# Patient Record
Sex: FEMALE | Race: WHITE | NOT HISPANIC OR LATINO
[De-identification: names, ages, dates, MRNs, and addresses within clinical notes are randomized per-mention and may not be internally consistent; named-entity substitution may affect disease eponyms.]

---

## 2017-01-17 ENCOUNTER — APPOINTMENT (OUTPATIENT)
Dept: OTOLARYNGOLOGY | Facility: CLINIC | Age: 67
End: 2017-01-17

## 2017-01-17 VITALS
HEIGHT: 64 IN | WEIGHT: 180 LBS | BODY MASS INDEX: 30.73 KG/M2 | DIASTOLIC BLOOD PRESSURE: 86 MMHG | HEART RATE: 62 BPM | SYSTOLIC BLOOD PRESSURE: 128 MMHG

## 2017-01-17 DIAGNOSIS — R68.89 OTHER GENERAL SYMPTOMS AND SIGNS: ICD-10-CM

## 2017-01-17 DIAGNOSIS — F45.8 OTHER SOMATOFORM DISORDERS: ICD-10-CM

## 2017-01-17 DIAGNOSIS — I10 ESSENTIAL (PRIMARY) HYPERTENSION: ICD-10-CM

## 2017-01-17 DIAGNOSIS — K21.9 GASTRO-ESOPHAGEAL REFLUX DISEASE W/OUT ESOPHAGITIS: ICD-10-CM

## 2017-01-17 DIAGNOSIS — E78.00 PURE HYPERCHOLESTEROLEMIA, UNSPECIFIED: ICD-10-CM

## 2017-01-17 DIAGNOSIS — Z87.891 PERSONAL HISTORY OF NICOTINE DEPENDENCE: ICD-10-CM

## 2017-01-20 RX ORDER — ATENOLOL AND CHLORTHALIDONE 50; 25 MG/1; MG/1
50-25 TABLET ORAL
Refills: 0 | Status: ACTIVE | COMMUNITY

## 2017-01-20 RX ORDER — EZETIMIBE 10 MG/1
10 TABLET ORAL
Refills: 0 | Status: ACTIVE | COMMUNITY

## 2017-01-20 RX ORDER — ROSUVASTATIN CALCIUM 40 MG/1
40 TABLET, FILM COATED ORAL
Refills: 0 | Status: ACTIVE | COMMUNITY

## 2017-01-21 PROBLEM — I10 HIGH BLOOD PRESSURE: Status: ACTIVE | Noted: 2017-01-17

## 2017-01-21 PROBLEM — Z87.891 FORMER SMOKER: Status: ACTIVE | Noted: 2017-01-17

## 2017-01-21 PROBLEM — E78.00 HIGH CHOLESTEROL: Status: ACTIVE | Noted: 2017-01-17

## 2017-04-18 ENCOUNTER — APPOINTMENT (OUTPATIENT)
Dept: OTOLARYNGOLOGY | Facility: CLINIC | Age: 67
End: 2017-04-18

## 2018-10-25 ENCOUNTER — APPOINTMENT (OUTPATIENT)
Dept: ORTHOPEDIC SURGERY | Facility: CLINIC | Age: 68
End: 2018-10-25
Payer: COMMERCIAL

## 2018-10-25 VITALS
OXYGEN SATURATION: 97 % | HEIGHT: 64 IN | HEART RATE: 73 BPM | WEIGHT: 180 LBS | DIASTOLIC BLOOD PRESSURE: 80 MMHG | BODY MASS INDEX: 30.73 KG/M2 | SYSTOLIC BLOOD PRESSURE: 126 MMHG

## 2018-10-25 PROCEDURE — 20610 DRAIN/INJ JOINT/BURSA W/O US: CPT | Mod: RT

## 2018-10-25 PROCEDURE — 99204 OFFICE O/P NEW MOD 45 MIN: CPT | Mod: 25

## 2018-10-25 PROCEDURE — 73564 X-RAY EXAM KNEE 4 OR MORE: CPT | Mod: RT

## 2018-10-25 RX ORDER — METHYLPRED ACET/NACL,ISO-OS/PF 40 MG/ML
40 VIAL (ML) INJECTION
Qty: 1 | Refills: 0 | Status: ACTIVE | COMMUNITY
Start: 2018-10-25

## 2019-03-31 ENCOUNTER — FORM ENCOUNTER (OUTPATIENT)
Age: 69
End: 2019-03-31

## 2019-04-01 ENCOUNTER — APPOINTMENT (OUTPATIENT)
Dept: ORTHOPEDIC SURGERY | Facility: CLINIC | Age: 69
End: 2019-04-01
Payer: COMMERCIAL

## 2019-04-01 ENCOUNTER — OUTPATIENT (OUTPATIENT)
Dept: OUTPATIENT SERVICES | Facility: HOSPITAL | Age: 69
LOS: 1 days | End: 2019-04-01
Payer: COMMERCIAL

## 2019-04-01 DIAGNOSIS — Z78.9 OTHER SPECIFIED HEALTH STATUS: ICD-10-CM

## 2019-04-01 DIAGNOSIS — Z86.39 PERSONAL HISTORY OF OTHER ENDOCRINE, NUTRITIONAL AND METABOLIC DISEASE: ICD-10-CM

## 2019-04-01 PROCEDURE — 73562 X-RAY EXAM OF KNEE 3: CPT

## 2019-04-01 PROCEDURE — 20610 DRAIN/INJ JOINT/BURSA W/O US: CPT | Mod: RT

## 2019-04-01 PROCEDURE — 73562 X-RAY EXAM OF KNEE 3: CPT | Mod: 26,RT

## 2019-04-01 PROCEDURE — 99213 OFFICE O/P EST LOW 20 MIN: CPT | Mod: 25

## 2019-04-01 NOTE — REVIEW OF SYSTEMS
[Joint Pain] : joint pain [Negative] : Heme/Lymph Consent (Spinal Accessory)/Introductory Paragraph: The rationale for Mohs was explained to the patient and consent was obtained. The risks, benefits and alternatives to therapy were discussed in detail. Specifically, the risks of damage to the spinal accessory nerve, infection, scarring, bleeding, prolonged wound healing, incomplete removal, allergy to anesthesia, and recurrence were addressed. Prior to the procedure, the treatment site was clearly identified and confirmed by the patient. All components of Universal Protocol/PAUSE Rule completed.

## 2019-04-02 VITALS
BODY MASS INDEX: 30.73 KG/M2 | SYSTOLIC BLOOD PRESSURE: 120 MMHG | HEART RATE: 75 BPM | OXYGEN SATURATION: 99 % | HEIGHT: 64 IN | DIASTOLIC BLOOD PRESSURE: 76 MMHG | WEIGHT: 180 LBS

## 2019-04-02 PROBLEM — Z86.39 HISTORY OF HIGH CHOLESTEROL: Status: RESOLVED | Noted: 2019-04-02 | Resolved: 2019-04-02

## 2019-04-02 PROBLEM — Z78.9 EXERCISES OCCASIONALLY: Status: ACTIVE | Noted: 2019-04-02

## 2019-04-02 PROBLEM — Z78.9 DOES NOT USE ILLICIT DRUGS: Status: ACTIVE | Noted: 2019-04-02

## 2019-04-08 NOTE — PHYSICAL EXAM
[de-identified] : Focused exam of the right knee: No scars or previous incisions, no swelling edema erythema redness or drainage, tender medially, range of motion 0-120, stable, distally neurovascularly intact, 5/5 strength, no assistive devices. [de-identified] : X-rays of the left knee show bone-on-bone contact in medial compartment and moderately decreased joint space in the patellofemoral compartment.

## 2019-04-08 NOTE — PROCEDURE
[de-identified] : After obtaining verbal consent and under the normal sterile conditions the right knee joint was injected today with a solution of 4 cc of 1% lidocaine and 1 cc of 40 mg per mL of triamcinolone. The patient tolerated the procedure well.

## 2019-04-08 NOTE — ASSESSMENT
[FreeTextEntry1] : Assessment\par #1 left knee arthritis\par \par Plan\par #1 the left knee was injected as described above\par #2 followup in 2 weeks may consider shoulder injections at that time...

## 2019-05-06 ENCOUNTER — APPOINTMENT (OUTPATIENT)
Dept: ORTHOPEDIC SURGERY | Facility: CLINIC | Age: 69
End: 2019-05-06
Payer: COMMERCIAL

## 2019-05-06 PROCEDURE — 20610 DRAIN/INJ JOINT/BURSA W/O US: CPT | Mod: RT

## 2019-05-06 PROCEDURE — 99212 OFFICE O/P EST SF 10 MIN: CPT | Mod: 25

## 2019-05-08 NOTE — PROCEDURE
[de-identified] : After obtaining verbal consent and under the normal sterile conditions each shoulder was injected with a solution of 4 cc of 1% lidocaine and 1 cc of 40 mg per mL of triamcinolone.

## 2019-05-08 NOTE — ASSESSMENT
[FreeTextEntry1] : Assessment\par #1 internal derangement bilateral shoulders\par \par Plan\par #1 bilateral shoulders were injected as described above\par #2 followup in 2 months for repeat injections of bilateral knees

## 2019-05-08 NOTE — PHYSICAL EXAM
[de-identified] : Focused exam bilateral shoulders: No swelling edema erythema redness or drainage. Nontender. Range of motion: Forward flexion 150, abduction 150, internal/external rotation 60, 5/5 strength.

## 2019-05-08 NOTE — HISTORY OF PRESENT ILLNESS
[de-identified] : A 69-year-old lady with known history of bilateral knee and shoulder arthritis her today for her shoulders. She has pain with overhead reaching, dressing, and lifting greater than 10 pounds. She has intermittent mechanical symptoms without subluxation dislocation.

## 2019-06-12 ENCOUNTER — APPOINTMENT (OUTPATIENT)
Dept: ORTHOPEDIC SURGERY | Facility: CLINIC | Age: 69
End: 2019-06-12

## 2019-09-09 ENCOUNTER — APPOINTMENT (OUTPATIENT)
Dept: ORTHOPEDIC SURGERY | Facility: CLINIC | Age: 69
End: 2019-09-09

## 2019-09-18 ENCOUNTER — APPOINTMENT (OUTPATIENT)
Dept: ORTHOPEDIC SURGERY | Facility: CLINIC | Age: 69
End: 2019-09-18
Payer: COMMERCIAL

## 2019-09-18 PROCEDURE — 20610 DRAIN/INJ JOINT/BURSA W/O US: CPT | Mod: RT

## 2019-09-18 PROCEDURE — 99213 OFFICE O/P EST LOW 20 MIN: CPT | Mod: 25

## 2019-09-25 ENCOUNTER — APPOINTMENT (OUTPATIENT)
Dept: PULMONOLOGY | Facility: CLINIC | Age: 69
End: 2019-09-25
Payer: COMMERCIAL

## 2019-09-25 VITALS
SYSTOLIC BLOOD PRESSURE: 150 MMHG | OXYGEN SATURATION: 97 % | HEIGHT: 64 IN | BODY MASS INDEX: 30.73 KG/M2 | HEART RATE: 83 BPM | DIASTOLIC BLOOD PRESSURE: 96 MMHG | TEMPERATURE: 97.7 F | WEIGHT: 180 LBS

## 2019-09-25 DIAGNOSIS — R09.82 POSTNASAL DRIP: ICD-10-CM

## 2019-09-25 DIAGNOSIS — R05 COUGH: ICD-10-CM

## 2019-09-25 PROCEDURE — 99204 OFFICE O/P NEW MOD 45 MIN: CPT

## 2019-09-25 RX ORDER — HYDROCODONE POLISTIREX AND CHLORPHENIRAMINE POLISTIREX 10; 8 MG/5ML; MG/5ML
10-8 SUSPENSION, EXTENDED RELEASE ORAL
Qty: 1 | Refills: 0 | Status: DISCONTINUED | COMMUNITY
Start: 2019-09-25 | End: 2019-09-25

## 2019-09-25 NOTE — REVIEW OF SYSTEMS
[Nasal Congestion] : nasal congestion [Postnasal Drip] : postnasal drip [Cough] : cough [Sputum] : not coughing up ~M sputum [Dyspnea] : no dyspnea [Chest Tightness] : no chest tightness [Negative] : Hematologic

## 2019-09-25 NOTE — PHYSICAL EXAM
[General Appearance - Well Developed] : well developed [General Appearance - Well Nourished] : well nourished [Normal Conjunctiva] : the conjunctiva exhibited no abnormalities [Normal Oropharynx] : normal oropharynx [Neck Appearance] : the appearance of the neck was normal [Heart Rate And Rhythm] : heart rate and rhythm were normal [Heart Sounds] : normal S1 and S2 [Murmurs] : no murmurs present [Arterial Pulses Normal] : the arterial pulses were normal [Respiration, Rhythm And Depth] : normal respiratory rhythm and effort [Auscultation Breath Sounds / Voice Sounds] : lungs were clear to auscultation bilaterally [Bowel Sounds] : normal bowel sounds [Abdomen Soft] : soft [Abdomen Tenderness] : non-tender [] : no hepato-splenomegaly [Nail Clubbing] : no clubbing of the fingernails [Abnormal Walk] : normal gait [Cyanosis, Localized] : no localized cyanosis [Skin Color & Pigmentation] : normal skin color and pigmentation [Skin Turgor] : normal skin turgor

## 2019-09-25 NOTE — HISTORY OF PRESENT ILLNESS
[FreeTextEntry1] : 69F with 3 weeks of dry persistent cough which started as a cold. Mostly dry with significant postnasal drip. No sob or mills, symptoms prominent at night. Inhaler albuterol did not help. She reports always coughing for long time after a cold. Denies seasonal allergies, no fever or chills, no GERD symptoms.

## 2019-09-25 NOTE — ASSESSMENT
[FreeTextEntry1] : subacute dry cough, most likely postviral syndrome and postnasal drip. Recommend flonase BID, claritin and asmanex 2 puffs BID. Hydrocodone at night. RTC in 4 weeks for f/u. CXR was clear.

## 2019-09-26 ENCOUNTER — OTHER (OUTPATIENT)
Age: 69
End: 2019-09-26

## 2019-09-26 RX ORDER — HYDROCODONE POLISTIREX AND CHLORPHENIRAMINE POLISTIREX 10; 8 MG/5ML; MG/5ML
10-8 SUSPENSION, EXTENDED RELEASE ORAL
Qty: 1 | Refills: 0 | Status: ACTIVE | COMMUNITY
Start: 2019-09-25 | End: 1900-01-01

## 2019-09-26 NOTE — PHYSICAL EXAM
[de-identified] : General: Not in acute distress, dressed appropriately, sitting on examination table\par Skin: Warm and dry, normal turgor, no rashes\par Neurological: AOx3, Cranial nerves grossly in tact\par Psych: Mood and affect appropriate\par \par Right Knee: No swelling edema erythema redness or drainage. Alignment: Neutral. Tender: Medially. ROM: 0-120. Stable. . 5/5 Strength. DNVI. Ambulates with no devices.\par \par

## 2019-09-26 NOTE — PROCEDURE
[de-identified] : After obtaining verbal consent and under normal sterile conditions the right knee joint was injected with 4 cc of lidocaine and 1 cc of 40 mg per mL of Kenalog.

## 2019-09-26 NOTE — ASSESSMENT
[FreeTextEntry1] : Assessment/plan\par Right knee arthritis\par \par The right knee was injected as described above, today she'll rest ice and use Tylenol as needed for pain, she'll follow up in 3 months or sooner as needed if she wants another injection.\par \par All medical record entries made by the PA/Susie/Fellow are at my, Dr. Toñito Raygoza's direction and personally dictated by me on 09/18/2019]. I have reviewed the chart and agree that the record accurately reflects my personal performance of the history, physical exam, assessment, and plan. I have also personally directed reviewed, and agreed with the chart.\par

## 2019-09-26 NOTE — HISTORY OF PRESENT ILLNESS
[de-identified] : Here today for her right knee injection, her last one was back in May is worn off recently she is requesting repeat injection now. No other complaints at this time.

## 2019-11-27 ENCOUNTER — APPOINTMENT (OUTPATIENT)
Dept: ORTHOPEDIC SURGERY | Facility: CLINIC | Age: 69
End: 2019-11-27
Payer: COMMERCIAL

## 2019-11-27 PROCEDURE — 99213 OFFICE O/P EST LOW 20 MIN: CPT

## 2019-11-27 RX ORDER — MELOXICAM 7.5 MG/1
7.5 TABLET ORAL DAILY
Qty: 60 | Refills: 0 | Status: ACTIVE | COMMUNITY
Start: 2019-11-27 | End: 1900-01-01

## 2019-12-05 NOTE — HISTORY OF PRESENT ILLNESS
[de-identified] : 70 yo female here c/o intermittent, dull/achy, 7/10, right knee pain since a fall and twisting injury of the right knee.\par \par No current medications; cortico injection on 09/18/2020 of right knee; Never had PT

## 2019-12-05 NOTE — ASSESSMENT
[FreeTextEntry1] : Assessment/Plan:\par \par Pt. right knee pain has returned and would benefit right knee cortico steroid injection today. \par \par Educated Pt. on at home joint strengthening exercises and weight loss; Prescribed Mobiq 7.5 mg 1-2x/day; cortico injection of the right knee was performed today

## 2019-12-05 NOTE — REASON FOR VISIT
[Follow-Up Visit] : a follow-up visit for [Initial Visit] : an initial visit for [Knee Pain] : knee pain [Other: ____] : [unfilled] [Hip Pain] : hip pain [FreeTextEntry2] : b

## 2019-12-05 NOTE — REASON FOR VISIT
[Follow-Up Visit] : a follow-up visit for [Initial Visit] : an initial visit for [Other: ____] : [unfilled] [Knee Pain] : knee pain [Hip Pain] : hip pain [FreeTextEntry2] : b

## 2019-12-05 NOTE — PROCEDURE
[de-identified] : After obtaining verbal consent and after clearly explaining the risks, benefits, complications, complications, and alternatives. Under the normal sterile conditions the area was sterile prepped and the right knee was injected with a syringe that contains a solution of 1cc of triamcinolone (40 mg/mL) and 4 cc of 1% lidocaine (10 mg/mL)

## 2019-12-05 NOTE — PROCEDURE
[de-identified] : After obtaining verbal consent and after clearly explaining the risks, benefits, complications, complications, and alternatives. Under the normal sterile conditions the area was sterile prepped and the right knee was injected with a syringe that contains a solution of 1cc of triamcinolone (40 mg/mL) and 4 cc of 1% lidocaine (10 mg/mL)

## 2019-12-05 NOTE — HISTORY OF PRESENT ILLNESS
[de-identified] : 70 yo female here c/o intermittent, dull/achy, 7/10, right knee pain since a fall and twisting injury of the right knee.\par \par No current medications; cortico injection on 09/18/2020 of right knee; Never had PT

## 2019-12-05 NOTE — PHYSICAL EXAM
[de-identified] : Not in acute distress, dressed appropriately, sitting on examination table \par Skin: Warm and dry, normal turgor, no rashes \par Neurological: AOx3, Cranial nerves grossly in tact \par Psych: Mood and affect appropriate \par Focused exam of the Right Knee: No swelling edema erythema redness or drainage. Non-tender to palpation. Non-tender w/ MCL & LCL examination. Alignment: Neutral. ROM: 0-120. Stable. 5/5 Strength. DNVI. Ambulates without devices.\par \par Focused exam of the Left Knee: No swelling edema erythema redness or drainage. Non-tender to palpation. Non-tender w/ MCL & LCL examination. Alignment: Neutral. ROM: 0-120. Stable. 5/5 Strength. DNVI. Ambulates without devices.\par  [de-identified] : no images today

## 2019-12-05 NOTE — PHYSICAL EXAM
[de-identified] : Not in acute distress, dressed appropriately, sitting on examination table \par Skin: Warm and dry, normal turgor, no rashes \par Neurological: AOx3, Cranial nerves grossly in tact \par Psych: Mood and affect appropriate \par Focused exam of the Right Knee: No swelling edema erythema redness or drainage. Non-tender to palpation. Non-tender w/ MCL & LCL examination. Alignment: Neutral. ROM: 0-120. Stable. 5/5 Strength. DNVI. Ambulates without devices.\par \par Focused exam of the Left Knee: No swelling edema erythema redness or drainage. Non-tender to palpation. Non-tender w/ MCL & LCL examination. Alignment: Neutral. ROM: 0-120. Stable. 5/5 Strength. DNVI. Ambulates without devices.\par  [de-identified] : no images today

## 2020-02-04 NOTE — HISTORY OF PRESENT ILLNESS
[de-identified] : 69-year-old lady with a known history of right knee arthritis here today for a cortisone injection. She has done very well with these injections periodically every 8-12 months for the last several years. Her knee pain is medial and anterior worse with weightbearing ambulation and stairs, better with rest and Tylenol p.r.n. sudden onset

## 2020-03-10 ENCOUNTER — APPOINTMENT (OUTPATIENT)
Dept: ORTHOPEDIC SURGERY | Facility: CLINIC | Age: 70
End: 2020-03-10
Payer: COMMERCIAL

## 2020-03-10 PROCEDURE — 20610 DRAIN/INJ JOINT/BURSA W/O US: CPT | Mod: RT,59

## 2020-03-10 PROCEDURE — 99213 OFFICE O/P EST LOW 20 MIN: CPT | Mod: 25

## 2020-03-11 NOTE — PROCEDURE
[de-identified] : After obtaining verbal consent and under normal sterile conditions the right knee and bilateral shoulders joints was injected with 4 cc of lidocaine and 1 cc of 40 mg per mL of Kenalog.\par \par

## 2020-03-11 NOTE — END OF VISIT
[FreeTextEntry3] : By signing my name below, I, Baylee Cohen, attest that this documentation has been prepared under the direction and in the presence of Raymond Giron PA-C.

## 2020-03-11 NOTE — ASSESSMENT
[FreeTextEntry1] : Assessment/Plan\par \par Bilateral shoulder OA\par Right knee OA\par \par The right knee and bilateral shoulders was injected as described above, today they will rest ice and use Tylenol as needed for pain. \par \par F/U in 3 months or as needed\par \par \par All medical record entries made by the PA/Susie/Fellow are at my, Dr. Toñito Raygoza's direction and personally dictated by me on 03/10/2020]. I have reviewed the chart and agree that the record accurately reflects my personal performance of the history, physical exam, assessment, and plan. I have also personally directed reviewed, and agreed with the chart.\par \par \par

## 2020-03-11 NOTE — HISTORY OF PRESENT ILLNESS
[de-identified] : Taylor is a 70 year old female here for follow up of right knee and bilateral shoulder pain. Patient is requesting to have a bilateral shoulder and right knee injection today. No changes since last visit.

## 2020-03-11 NOTE — PHYSICAL EXAM
[de-identified] : Not in acute distress, dressed appropriately, sitting on examination table \par Skin: Warm and dry, normal turgor, no rashes \par Neurological: AOx3, Cranial nerves grossly in tact \par Psych: Mood and affect appropriate \par \par Focused exam of the Right Knee: No swelling edema erythema redness or drainage. Non-tender to palpation. Non-tender w/ MCL & LCL examination. Alignment: Neutral. ROM: 0-120. Stable. 5/5 Strength. DNVI. Ambulates without devices. [de-identified] : No imaging today.\par

## 2020-03-20 ENCOUNTER — APPOINTMENT (OUTPATIENT)
Dept: PULMONOLOGY | Facility: CLINIC | Age: 70
End: 2020-03-20

## 2020-03-24 RX ORDER — MOMETASONE FUROATE 200 UG/1
200 AEROSOL RESPIRATORY (INHALATION) TWICE DAILY
Qty: 3 | Refills: 0 | Status: DISCONTINUED | COMMUNITY
Start: 2020-03-19 | End: 2020-03-24

## 2020-03-24 RX ORDER — BECLOMETHASONE DIPROPIONATE HFA 80 UG/1
80 AEROSOL, METERED RESPIRATORY (INHALATION)
Qty: 3 | Refills: 0 | Status: ACTIVE | COMMUNITY
Start: 2020-03-24 | End: 1900-01-01

## 2020-06-17 ENCOUNTER — APPOINTMENT (OUTPATIENT)
Dept: ORTHOPEDIC SURGERY | Facility: CLINIC | Age: 70
End: 2020-06-17
Payer: COMMERCIAL

## 2020-06-17 PROCEDURE — 99213 OFFICE O/P EST LOW 20 MIN: CPT | Mod: 25

## 2020-06-17 PROCEDURE — 20610 DRAIN/INJ JOINT/BURSA W/O US: CPT | Mod: LT

## 2020-06-26 NOTE — PHYSICAL EXAM
[de-identified] : Not in acute distress, dressed appropriately, sitting on examination table \par Skin: Warm and dry, normal turgor, no rashes \par Neurological: AOx3, Cranial nerves grossly in tact \par Psych: Mood and affect appropriate \par Focused exam of the Right Knee: No swelling edema erythema redness or drainage. medially tender to palpation. Non-tender w/ MCL & LCL examination. Alignment: Neutral. ROM: 0-110. Stable. 5/5 Strength. DNVI. Ambulates without devices.\par \par Focused exam of the Left Knee: No swelling edema erythema redness or drainage. medially tender to palpation. Non-tender w/ MCL & LCL examination. Alignment: Neutral. ROM: 0-110. Stable. 5/5 Strength. DNVI. Ambulates without devices.\par \par \par \par  [de-identified] : Xrays shows Right knee severe bone on bone OA

## 2020-06-26 NOTE — HISTORY OF PRESENT ILLNESS
[de-identified] : 69 yo female here c/o intermittent, 8/10, dull/achy, bilateral shoulder pain and Right knee pain since 3 months ago.\par \par Last b/l shoulder and Right knee corticosteroid injection performed 03/10/20 Bladder non-tender and non-distended. Urine clear yellow.

## 2020-06-26 NOTE — ASSESSMENT
[FreeTextEntry1] : Assessment/Plan:\par \par    71 yo female here with severe Right knee osteoarthritis here requesting bilateral knee corticosteroid injections today \par \par   Plan:  \par 1) left knees were injected with corticosteroid injection as described above, today they will rest ice and use Tylenol as needed for pain.\par   2) F/U in 3 months.  \par \par All medical record entries made by the PA/Scribe/Fellow are at my, Dr. Toñito Raygoza's direction and personally dictated by me on 06/17/2020]. I have reviewed the chart and agree that the record accurately reflects my personal performance of the history, physical exam, assessment, and plan. I have also personally directed reviewed, and agreed with the chart.\par

## 2020-06-26 NOTE — PROCEDURE
[de-identified] : \par After obtaining verbal consent and after clearly explaining the risks, benefits, complications, complications, and alternatives. Under the normal sterile conditions the area was sterile prepped and left knee was injected with a syringe x1 that contains a solution of 1cc of triamcinolone (40 mg/mL) and 4 cc of 1% lidocaine (10 mg/mL)\par

## 2020-07-06 ENCOUNTER — APPOINTMENT (OUTPATIENT)
Dept: ORTHOPEDIC SURGERY | Facility: CLINIC | Age: 70
End: 2020-07-06
Payer: COMMERCIAL

## 2020-07-06 PROCEDURE — 20610 DRAIN/INJ JOINT/BURSA W/O US: CPT | Mod: 59,RT

## 2020-07-06 PROCEDURE — 99213 OFFICE O/P EST LOW 20 MIN: CPT | Mod: 25

## 2020-07-10 NOTE — DISCUSSION/SUMMARY
[de-identified] : A\par R knee OA\par R shoulder OA\par \par P\par R knee and shoulder CSI\par Home exercises\par OTC analgesia\par New bilateral knee and shoulder xrays at next visit\par RTO 3 months prn\par \par \par All medical record entries made by the PA/Susie/Fellow are at my, Dr. Toñito Raygoza's direction and personally dictated by me on 07/06/2020]. I have reviewed the chart and agree that the record accurately reflects my personal performance of the history, physical exam, assessment, and plan. I have also personally directed reviewed, and agreed with the chart.\par

## 2020-07-10 NOTE — PROCEDURE
[de-identified] : Under sterile conditions afer verbal consent the right knee and shoulder was injected with 40mg Kenalog and 4cc of lidocaine plain. Patient tolerated well with interval improvement in symptoms.

## 2020-07-10 NOTE — HISTORY OF PRESENT ILLNESS
[de-identified] : F/u for right knee and shoulder injection for OA. responded well after her CSIs in March. Continuing to take OTC analgesia.

## 2020-11-18 ENCOUNTER — APPOINTMENT (OUTPATIENT)
Dept: ORTHOPEDIC SURGERY | Facility: CLINIC | Age: 70
End: 2020-11-18
Payer: COMMERCIAL

## 2020-11-18 PROCEDURE — 20610 DRAIN/INJ JOINT/BURSA W/O US: CPT | Mod: 50

## 2020-11-18 PROCEDURE — 99214 OFFICE O/P EST MOD 30 MIN: CPT | Mod: 25

## 2020-11-18 RX ORDER — MELOXICAM 15 MG/1
15 TABLET ORAL
Qty: 60 | Refills: 0 | Status: ACTIVE | COMMUNITY
Start: 2020-11-18 | End: 1900-01-01

## 2020-11-21 NOTE — PHYSICAL EXAM
[de-identified] : General: Not in acute distress, dressed appropriately, sitting on examination table\par Skin: Warm and dry, normal turgor, no rashes\par Neurological: AOx3, cranial nerves grossly intact\par Psych: Mood and affect appropriate \par \par Right Knee: No swelling, edema, erythema, redness, or drainage. Tender medially. Alignment: Neutral ROM: 0-110 with pain. + crepitus. Stable. 5/5 strength. DNVI. Ambulates without assistance. \par \par Left Knee: No swelling, edema, erythema, redness, or drainage. Tender medially. Alignment: Neutral ROM: 0-110 with pain. + crepitus Stable. 5/5 strength. DNVI. Ambulates without assistance. \par \par Right Shoulder: Pain and crepitus with ROM. DNVI.\par \par Left Shoulder: Pain and crepitus with ROM. DNVI.\par \par  [de-identified] : No imaging today.

## 2020-11-21 NOTE — PROCEDURE
[de-identified] : After obtaining verbal consent and under normal sterile conditions the bilateral knees joints were injected with 4ccs of lidocaine and 1cc of 40mg/mL Kenalog.\par \par After obtaining verbal consent and under normal sterile conditions bilateral shoulder joints were injected with 4ccs of lidocaine and 1cc of 40mg/mL Kenalog.

## 2020-11-21 NOTE — ASSESSMENT
[FreeTextEntry1] : Assessment: \par Bilateral Knee OA; Bilateral Shoulder OA\par \par Plan:\par The bilateral knees and shoulders were injected as described above. Today they will rest, ice and use tylenol as needed for pain. Will also give prescription for Mobic. \par \par F/U in 3 months.\par \par All medical record entries made by the PA/Scribe/Fellow are at my, Dr. Toñito Raygoza's direction and personally dictated by me on 11/18/2020]. I have reviewed the chart and agree that the record accurately reflects my personal performance of the history, physical exam, assessment, and plan. I have also personally directed reviewed, and agreed with the chart.\par

## 2020-11-21 NOTE — END OF VISIT
[FreeTextEntry3] : By signing my name below, I, Roslyn Villa, attest that this documentation has been prepared under the direction and in the presence of Jarett Conklin MD.

## 2020-11-21 NOTE — HISTORY OF PRESENT ILLNESS
[de-identified] : 69 yo F here today for bilateral knee and shoulder steroid injections. Long history of OA in bilateral shoulders and knees. Reports good relief from previous CSI to the right knee and shoulder in July 2020. Pain has returned and moved bilaterally in last few weeks. Shoulder pain is rated 6/10, worse with overhead movements. Knee pain is rated 8/10, worse with prolonged ambulation and stairs, alleviated with rest.

## 2020-12-02 ENCOUNTER — APPOINTMENT (OUTPATIENT)
Dept: ORTHOPEDIC SURGERY | Facility: CLINIC | Age: 70
End: 2020-12-02

## 2021-05-18 ENCOUNTER — APPOINTMENT (OUTPATIENT)
Dept: ORTHOPEDIC SURGERY | Facility: CLINIC | Age: 71
End: 2021-05-18
Payer: MEDICAID

## 2021-05-18 DIAGNOSIS — M24.811 OTHER SPECIFIC JOINT DERANGEMENTS OF RIGHT SHOULDER, NOT ELSEWHERE CLASSIFIED: ICD-10-CM

## 2021-05-18 DIAGNOSIS — M17.11 UNILATERAL PRIMARY OSTEOARTHRITIS, RIGHT KNEE: ICD-10-CM

## 2021-05-18 DIAGNOSIS — M25.561 PAIN IN RIGHT KNEE: ICD-10-CM

## 2021-05-18 DIAGNOSIS — M23.92 UNSPECIFIED INTERNAL DERANGEMENT OF LEFT KNEE: ICD-10-CM

## 2021-05-18 PROCEDURE — 99212 OFFICE O/P EST SF 10 MIN: CPT | Mod: 25

## 2021-05-18 PROCEDURE — 20610 DRAIN/INJ JOINT/BURSA W/O US: CPT | Mod: 50

## 2021-05-28 NOTE — PROCEDURE
[de-identified] : After obtaining verbal consent and under normal sterile conditions the bilateral knees joints were injected with 4ccs of lidocaine and 1cc of 40mg/mL Kenalog.\par \par After obtaining verbal consent and under normal sterile conditions bilateral shoulder joints were injected with 4ccs of lidocaine and 1cc of 40mg/mL Kenalog. \par

## 2021-05-28 NOTE — END OF VISIT
[FreeTextEntry3] : By signing my name below, I, Angy Guevara, attest that this documentation has been prepared under the direction and in the presence of Raymond Giron PA-C.\par

## 2021-05-28 NOTE — HISTORY OF PRESENT ILLNESS
[de-identified] : 72 y/o F here today to follow up for bilateral knee and shoulder pain. She has had csi done on last visit 11/18/20 and reports that it provided her with great relief. However the pain has been worsening in the past few weeks and she would like repeat injections today. \par \par \par

## 2021-05-28 NOTE — PHYSICAL EXAM
[de-identified] : General: Not in acute distress, dressed appropriately, sitting on examination table\par Skin: Warm and dry, normal turgor, no rashes\par Neurological: AOx3, cranial nerves grossly intact\par Psych: Mood and affect appropriate \par \par Right Knee: No swelling, edema, erythema, redness, or drainage. Tender medially. Alignment: Neutral ROM: 0-110 with pain. + crepitus. Stable. 5/5 strength. DNVI. Ambulates without assistance. \par \par Left Knee: No swelling, edema, erythema, redness, or drainage. Tender medially. Alignment: Neutral ROM: 0-110 with pain. + crepitus Stable. 5/5 strength. DNVI. Ambulates without assistance. \par \par Right Shoulder: Pain and crepitus with ROM. DNVI.\par \par Left Shoulder: Pain and crepitus with ROM. DNVI. [de-identified] : No imaging today.\par

## 2021-06-09 ENCOUNTER — APPOINTMENT (OUTPATIENT)
Dept: ORTHOPEDIC SURGERY | Facility: CLINIC | Age: 71
End: 2021-06-09
Payer: COMMERCIAL

## 2021-06-09 PROCEDURE — ZZZZZ: CPT

## 2021-12-17 NOTE — PHYSICAL EXAM
----- Message from Jeana Lindsey MD sent at 12/17/2021  1:49 PM CST -----  Thyroid ok, t3 and t4 in normal range, continue levothyroxine 100 mcg daily   [de-identified] : RUE: Pain an crepitus with ROM shoulder. - spurlings. limited abduction. DNVI\par RLE: Pain and creiputs with ROM with trace effusion and no erythema. Lig stable to knee. DNVI

## 2024-08-01 ENCOUNTER — TRANSCRIPTION ENCOUNTER (OUTPATIENT)
Age: 74
End: 2024-08-01

## 2024-08-01 VITALS
TEMPERATURE: 98 F | OXYGEN SATURATION: 95 % | SYSTOLIC BLOOD PRESSURE: 138 MMHG | HEART RATE: 70 BPM | RESPIRATION RATE: 16 BRPM | WEIGHT: 174.17 LBS | HEIGHT: 61 IN | DIASTOLIC BLOOD PRESSURE: 83 MMHG

## 2024-08-01 RX ORDER — POVIDONE-IODINE 0.1 G/ML
1 SOLUTION TOPICAL ONCE
Refills: 0 | Status: DISCONTINUED | OUTPATIENT
Start: 2024-08-02 | End: 2024-08-02

## 2024-08-01 NOTE — H&P ADULT - PROBLEM SELECTOR PLAN 1
Admit to Orthopaedic Service.  Presents today for elective right POWER   Pt medically stable and cleared for procedure today by Dr. Mo

## 2024-08-01 NOTE — H&P ADULT - HISTORY OF PRESENT ILLNESS
74F with right hip pain x  Presents today for right POWER     74F with right hip pain since 2021 that has been worsening over the past 6 months that has not improved. Pt has failed conservative treatments, including steroid injections. Pt stated that she last had her injections in May, which prevented her from having surgery done sooner than she had hoped. The pain is constant an daily, worse at night and when walking long distances. Pt endorses trying Ibuprofen, tramadol, diclofenac, which did not help her pain. She also takes tylenol, which does help her and she takes it only as needed. Pt states that the pain has been limit her daily activities and now she can no longer walk city blocks like she use to. Denies numbness, tingling, weakness. Ambulates with use a cane at  baseline due to her pain. Denies hx of CP, MI, stroke, seizures, n/v, blood thinners.    Presents today for right POWER    has pt been taking opioids > 90 days? No 74F with right hip pain since 2021 that has been worsening over the past 6 months that has not improved. Pt has failed conservative treatments, including steroid injections. Pt stated that she last had her injections in May, which prevented her from having surgery done sooner than she had hoped. The pain is constant an daily, worse at night and when walking long distances. Pt endorses trying Ibuprofen, tramadol, diclofenac, which did not help her pain. She also takes tylenol, which does help her and she takes it only as needed. Pt states that the pain has been limit her daily activities and now she can no longer walk city blocks like she use to. Denies numbness, tingling, weakness. Ambulates with use a cane at  baseline due to her pain. Denies hx of CP, MI, stroke, seizures, n/v,..    Presents today for right POWER    has pt been taking opioids > 90 days? No    On ASA 81

## 2024-08-01 NOTE — H&P ADULT - NSICDXPASTSURGICALHX_GEN_ALL_CORE_FT
PAST SURGICAL HISTORY:  H/O ovarian cystectomy     History of appendectomy     History of cholecystectomy     S/P cardiac cath     S/P cataract surgery both eyes

## 2024-08-01 NOTE — H&P ADULT - NSHPLABSRESULTS_GEN_ALL_CORE
Preop CBC, BMP, PT/PTT/INR,  within normal limits- reviewed by medical clearance.   Abnormal UA reviewed by Medicine, no bacteria and asxs   Preop EKG: NSR, reviewed by medical clearance.   Preop Cr 0.58  Preop A1c 6.7  Preop H/H 14.6/46.4  3M DOS

## 2024-08-01 NOTE — H&P ADULT - NSHPPHYSICALEXAM_GEN_ALL_CORE
PE: Decreased ROM to right hip secondary to pain    Rest of PE per medical clearance Gen: Alert and oriented x 3. NAD  PE: Decreased ROM to right hip secondary to pain  Skin warm without erythema, ecchymosis, abrasions or lesions, signs of infection  Calves soft, nontender bilaterally. Negative guy sign  Sensation intact to light touch bilateral lower extremities  Pulses: 2+ DP, skin well perfused, brisk capillary refill bilaterally   Quad/hamsting/EHL/FHL/TA/GS 5/5  strength bilaterally       Rest of PE per medical clearance

## 2024-08-01 NOTE — H&P ADULT - NSICDXPASTMEDICALHX_GEN_ALL_CORE_FT
PAST MEDICAL HISTORY:  Diabetes mellitus type 2    High cholesterol     HTN (hypertension)     OA (osteoarthritis)

## 2024-08-01 NOTE — PATIENT PROFILE ADULT - FALL HARM RISK - HARM RISK INTERVENTIONS
Male Communicate Risk of Fall with Harm to all staff/Reinforce activity limits and safety measures with patient and family/Tailored Fall Risk Interventions/Visual Cue: Yellow wristband and red socks/Bed in lowest position, wheels locked, appropriate side rails in place/Call bell, personal items and telephone in reach/Instruct patient to call for assistance before getting out of bed or chair/Non-slip footwear when patient is out of bed/[place] to call system/Physically safe environment - no spills, clutter or unnecessary equipment/Purposeful Proactive Rounding/Room/bathroom lighting operational, light cord in reach

## 2024-08-02 ENCOUNTER — INPATIENT (INPATIENT)
Facility: HOSPITAL | Age: 74
LOS: 3 days | Discharge: HOME HEALTH | End: 2024-08-06
Attending: ORTHOPAEDIC SURGERY | Admitting: ORTHOPAEDIC SURGERY
Payer: COMMERCIAL

## 2024-08-02 DIAGNOSIS — E78.0 PURE HYPERCHOLESTEROLEMIA: ICD-10-CM

## 2024-08-02 DIAGNOSIS — Z90.49 ACQUIRED ABSENCE OF OTHER SPECIFIED PARTS OF DIGESTIVE TRACT: Chronic | ICD-10-CM

## 2024-08-02 DIAGNOSIS — Z98.890 OTHER SPECIFIED POSTPROCEDURAL STATES: Chronic | ICD-10-CM

## 2024-08-02 DIAGNOSIS — Z98.49 CATARACT EXTRACTION STATUS, UNSPECIFIED EYE: Chronic | ICD-10-CM

## 2024-08-02 DIAGNOSIS — M19.90 UNSPECIFIED OSTEOARTHRITIS, UNSPECIFIED SITE: ICD-10-CM

## 2024-08-02 DIAGNOSIS — I10 ESSENTIAL (PRIMARY) HYPERTENSION: ICD-10-CM

## 2024-08-02 DIAGNOSIS — E11.9 TYPE 2 DIABETES MELLITUS WITHOUT COMPLICATIONS: ICD-10-CM

## 2024-08-02 LAB
GLUCOSE BLDC GLUCOMTR-MCNC: 140 MG/DL — HIGH (ref 70–99)
GLUCOSE BLDC GLUCOMTR-MCNC: 162 MG/DL — HIGH (ref 70–99)
GLUCOSE BLDC GLUCOMTR-MCNC: 185 MG/DL — HIGH (ref 70–99)

## 2024-08-02 PROCEDURE — 72170 X-RAY EXAM OF PELVIS: CPT | Mod: 26

## 2024-08-02 DEVICE — SCREW HEX LO PROF 6.5X40MM: Type: IMPLANTABLE DEVICE | Site: RIGHT | Status: FUNCTIONAL

## 2024-08-02 DEVICE — LINER ACET TRIDENT X3 0 DEG 36MM D: Type: IMPLANTABLE DEVICE | Site: RIGHT | Status: FUNCTIONAL

## 2024-08-02 DEVICE — MAKO CHECKPOINT 3.5MM HEX X 15MM: Type: IMPLANTABLE DEVICE | Site: RIGHT | Status: FUNCTIONAL

## 2024-08-02 DEVICE — SHELL ACET TRIDENT II D 48MM: Type: IMPLANTABLE DEVICE | Site: RIGHT | Status: FUNCTIONAL

## 2024-08-02 DEVICE — HEAD DELTA CER V40 36 PLUS 5: Type: IMPLANTABLE DEVICE | Site: RIGHT | Status: FUNCTIONAL

## 2024-08-02 DEVICE — SCREW HEX LO PROF 6.5X20MM: Type: IMPLANTABLE DEVICE | Site: RIGHT | Status: FUNCTIONAL

## 2024-08-02 DEVICE — MAKO BONE PIN 4MM X 110MM: Type: IMPLANTABLE DEVICE | Site: RIGHT | Status: FUNCTIONAL

## 2024-08-02 DEVICE — FEM INSIGNIA COLLARED SZ 4 32.5X103MM: Type: IMPLANTABLE DEVICE | Site: RIGHT | Status: FUNCTIONAL

## 2024-08-02 RX ORDER — GABAPENTIN 400 MG/1
300 CAPSULE ORAL AT BEDTIME
Refills: 0 | Status: DISCONTINUED | OUTPATIENT
Start: 2024-08-02 | End: 2024-08-06

## 2024-08-02 RX ORDER — CELECOXIB 200 MG/1
200 CAPSULE ORAL EVERY 12 HOURS
Refills: 0 | Status: DISCONTINUED | OUTPATIENT
Start: 2024-08-02 | End: 2024-08-06

## 2024-08-02 RX ORDER — PANTOPRAZOLE SODIUM 20 MG/1
40 TABLET, DELAYED RELEASE ORAL
Refills: 0 | Status: DISCONTINUED | OUTPATIENT
Start: 2024-08-02 | End: 2024-08-06

## 2024-08-02 RX ORDER — CEFAZOLIN SODIUM 10 G
2000 VIAL (EA) INJECTION EVERY 8 HOURS
Refills: 0 | Status: COMPLETED | OUTPATIENT
Start: 2024-08-02 | End: 2024-08-03

## 2024-08-02 RX ORDER — ASPIRIN 500 MG
81 TABLET ORAL
Refills: 0 | Status: DISCONTINUED | OUTPATIENT
Start: 2024-08-02 | End: 2024-08-06

## 2024-08-02 RX ORDER — OXYCODONE HYDROCHLORIDE 30 MG/1
5 TABLET ORAL
Refills: 0 | Status: DISCONTINUED | OUTPATIENT
Start: 2024-08-02 | End: 2024-08-06

## 2024-08-02 RX ORDER — CYANOCOBALAMIN/FOLIC AC/VIT B6 2-2.5-25MG
1 TABLET ORAL DAILY
Refills: 0 | Status: DISCONTINUED | OUTPATIENT
Start: 2024-08-02 | End: 2024-08-06

## 2024-08-02 RX ORDER — APREPITANT 40 MG
40 CAPSULE ORAL ONCE
Refills: 0 | Status: COMPLETED | OUTPATIENT
Start: 2024-08-02 | End: 2024-08-02

## 2024-08-02 RX ORDER — CHLORHEXIDINE GLUCONATE 500 MG/1
1 CLOTH TOPICAL EVERY 12 HOURS
Refills: 0 | Status: DISCONTINUED | OUTPATIENT
Start: 2024-08-02 | End: 2024-08-02

## 2024-08-02 RX ORDER — ATENOLOL/CHLORTHALIDONE 100MG-25MG
1 TABLET ORAL
Refills: 0 | DISCHARGE

## 2024-08-02 RX ORDER — OXYCODONE HYDROCHLORIDE 30 MG/1
10 TABLET ORAL
Refills: 0 | Status: DISCONTINUED | OUTPATIENT
Start: 2024-08-02 | End: 2024-08-06

## 2024-08-02 RX ORDER — DEXTROSE MONOHYDRATE, SODIUM CHLORIDE, SODIUM LACTATE, CALCIUM CHLORIDE, MAGNESIUM CHLORIDE 1.5; 538; 448; 18.4; 5.08 G/100ML; MG/100ML; MG/100ML; MG/100ML; MG/100ML
1000 SOLUTION INTRAPERITONEAL
Refills: 0 | Status: DISCONTINUED | OUTPATIENT
Start: 2024-08-02 | End: 2024-08-06

## 2024-08-02 RX ORDER — ATENOLOL 100 MG/1
100 TABLET ORAL DAILY
Refills: 0 | Status: DISCONTINUED | OUTPATIENT
Start: 2024-08-02 | End: 2024-08-06

## 2024-08-02 RX ORDER — GLUCAGON INJECTION, SOLUTION 0.5 MG/.1ML
1 INJECTION, SOLUTION SUBCUTANEOUS ONCE
Refills: 0 | Status: DISCONTINUED | OUTPATIENT
Start: 2024-08-02 | End: 2024-08-06

## 2024-08-02 RX ORDER — INSULIN LISPRO 100/ML
VIAL (ML) SUBCUTANEOUS
Refills: 0 | Status: DISCONTINUED | OUTPATIENT
Start: 2024-08-02 | End: 2024-08-06

## 2024-08-02 RX ORDER — ACETAMINOPHEN 500 MG
1000 TABLET ORAL EVERY 8 HOURS
Refills: 0 | Status: DISCONTINUED | OUTPATIENT
Start: 2024-08-02 | End: 2024-08-06

## 2024-08-02 RX ORDER — ACETAMINOPHEN 500 MG
1000 TABLET ORAL ONCE
Refills: 0 | Status: COMPLETED | OUTPATIENT
Start: 2024-08-02 | End: 2024-08-02

## 2024-08-02 RX ORDER — CELECOXIB 200 MG/1
200 CAPSULE ORAL ONCE
Refills: 0 | Status: COMPLETED | OUTPATIENT
Start: 2024-08-02 | End: 2024-08-02

## 2024-08-02 RX ORDER — ATORVASTATIN CALCIUM 40 MG/1
80 TABLET, FILM COATED ORAL AT BEDTIME
Refills: 0 | Status: DISCONTINUED | OUTPATIENT
Start: 2024-08-02 | End: 2024-08-06

## 2024-08-02 RX ORDER — DEXTROSE 4 G
12.5 TABLET,CHEWABLE ORAL ONCE
Refills: 0 | Status: DISCONTINUED | OUTPATIENT
Start: 2024-08-02 | End: 2024-08-06

## 2024-08-02 RX ORDER — HYDROMORPHONE HCL IN 0.9% NACL 0.2 MG/ML
0.5 PLASTIC BAG, INJECTION (ML) INTRAVENOUS
Refills: 0 | Status: DISCONTINUED | OUTPATIENT
Start: 2024-08-02 | End: 2024-08-06

## 2024-08-02 RX ORDER — ONDANSETRON HCL/PF 4 MG/2 ML
4 VIAL (ML) INJECTION EVERY 6 HOURS
Refills: 0 | Status: DISCONTINUED | OUTPATIENT
Start: 2024-08-02 | End: 2024-08-06

## 2024-08-02 RX ORDER — DEXTROSE 4 G
15 TABLET,CHEWABLE ORAL ONCE
Refills: 0 | Status: DISCONTINUED | OUTPATIENT
Start: 2024-08-02 | End: 2024-08-06

## 2024-08-02 RX ORDER — CEPHALEXIN 250 MG
500 CAPSULE ORAL
Refills: 0 | Status: DISCONTINUED | OUTPATIENT
Start: 2024-08-03 | End: 2024-08-03

## 2024-08-02 RX ORDER — EZETIMIBE 10 MG/1
1 TABLET ORAL
Refills: 0 | DISCHARGE

## 2024-08-02 RX ORDER — DEXTROSE 4 G
25 TABLET,CHEWABLE ORAL ONCE
Refills: 0 | Status: DISCONTINUED | OUTPATIENT
Start: 2024-08-02 | End: 2024-08-06

## 2024-08-02 RX ORDER — ROSUVASTATIN CALCIUM 10 MG
1 TABLET ORAL
Refills: 0 | DISCHARGE

## 2024-08-02 RX ORDER — SENNOSIDES 8.6 MG/1
2 TABLET ORAL AT BEDTIME
Refills: 0 | Status: DISCONTINUED | OUTPATIENT
Start: 2024-08-02 | End: 2024-08-06

## 2024-08-02 RX ORDER — DICLOFENAC 35 MG/1
1 CAPSULE ORAL
Refills: 0 | DISCHARGE

## 2024-08-02 RX ADMIN — Medication 40 MILLIGRAM(S): at 10:23

## 2024-08-02 RX ADMIN — DEXTROSE MONOHYDRATE, SODIUM CHLORIDE, SODIUM LACTATE, CALCIUM CHLORIDE, MAGNESIUM CHLORIDE 75 MILLILITER(S): 1.5; 538; 448; 18.4; 5.08 SOLUTION INTRAPERITONEAL at 19:30

## 2024-08-02 RX ADMIN — Medication 1000 MILLIGRAM(S): at 22:21

## 2024-08-02 RX ADMIN — CELECOXIB 200 MILLIGRAM(S): 200 CAPSULE ORAL at 10:24

## 2024-08-02 RX ADMIN — Medication 1000 MILLIGRAM(S): at 21:21

## 2024-08-02 RX ADMIN — CELECOXIB 200 MILLIGRAM(S): 200 CAPSULE ORAL at 18:00

## 2024-08-02 RX ADMIN — SENNOSIDES 2 TABLET(S): 8.6 TABLET ORAL at 21:21

## 2024-08-02 RX ADMIN — Medication 81 MILLIGRAM(S): at 18:01

## 2024-08-02 RX ADMIN — DEXTROSE MONOHYDRATE, SODIUM CHLORIDE, SODIUM LACTATE, CALCIUM CHLORIDE, MAGNESIUM CHLORIDE 75 MILLILITER(S): 1.5; 538; 448; 18.4; 5.08 SOLUTION INTRAPERITONEAL at 15:46

## 2024-08-02 RX ADMIN — ATORVASTATIN CALCIUM 80 MILLIGRAM(S): 40 TABLET, FILM COATED ORAL at 21:21

## 2024-08-02 RX ADMIN — CHLORHEXIDINE GLUCONATE 1 APPLICATION(S): 500 CLOTH TOPICAL at 12:39

## 2024-08-02 RX ADMIN — OXYCODONE HYDROCHLORIDE 10 MILLIGRAM(S): 30 TABLET ORAL at 18:33

## 2024-08-02 RX ADMIN — GABAPENTIN 300 MILLIGRAM(S): 400 CAPSULE ORAL at 23:00

## 2024-08-02 RX ADMIN — Medication 1000 MILLIGRAM(S): at 10:23

## 2024-08-02 RX ADMIN — Medication 100 MILLIGRAM(S): at 19:30

## 2024-08-02 RX ADMIN — Medication 2: at 22:31

## 2024-08-02 RX ADMIN — Medication 0.5 MILLIGRAM(S): at 15:46

## 2024-08-02 NOTE — PHYSICAL THERAPY INITIAL EVALUATION ADULT - GENERAL OBSERVATIONS, REHAB EVAL
Patient received semi-lopez in bed  in NAD on RA, +SCDs, +PIV. Cleared by SUSI Mora. Agreeable to PT.

## 2024-08-02 NOTE — PROGRESS NOTE ADULT - SUBJECTIVE AND OBJECTIVE BOX
Ortho Post Op Check    Procedure: R POWER  Surgeon: Lexis    Pt comfortable without complaints, pain controlled  Denies CP, SOB, N/V, numbness/tingling     Vital Signs Last 24 Hrs  T(C): 36.4 (08-02-24 @ 15:17), Max: 36.4 (08-02-24 @ 15:17)  T(F): 97.5 (08-02-24 @ 15:17), Max: 97.5 (08-02-24 @ 15:17)  HR: 62 (08-02-24 @ 16:47) (62 - 76)  BP: 97/54 (08-02-24 @ 16:47) (96/50 - 113/53)  BP(mean): 72 (08-02-24 @ 16:47) (68 - 81)  RR: 13 (08-02-24 @ 16:47) (13 - 22)  SpO2: 95% (08-02-24 @ 16:47) (93% - 100%)  I&O's Summary      General: Pt Alert and oriented, NAD  Aquacel DSG C/D/I  Pulses: 2+  Sensation: SILT  Motor: 5/5 EHL/FHL/TA/GS                A/P: 74yFemale POD#0 s/p R POWER  - Stable  - Pain Control  - DVT ppx: ASA 81 BID  - Post op abx: Ancef  - PT, WBS: WBAT, posterior hip precautions  Fu AM labs, pending PT    Ortho Pager [phone]

## 2024-08-02 NOTE — PHYSICAL THERAPY INITIAL EVALUATION ADULT - ADDITIONAL COMMENTS
Patient lives with spouse in apartment building with 17 steps to enter. Ambulates with SC at baseline. Reports two falls for past 6 months due to LOB.

## 2024-08-02 NOTE — PHYSICAL THERAPY INITIAL EVALUATION ADULT - PERTINENT HX OF CURRENT PROBLEM, REHAB EVAL
74F with right hip pain since 2021 that has been worsening over the past 6 months that has not improved. Pt has failed conservative treatments, including steroid injections. Pt stated that she last had her injections in May, which prevented her from having surgery done sooner than she had hoped. The pain is constant an daily, worse at night and when walking long distances. Pt endorses trying Ibuprofen, tramadol, diclofenac, which did not help her pain. She also takes tylenol, which does help her and she takes it only as needed. Pt states that the pain has been limit her daily activities and now she can no longer walk city blocks like she use to. Denies numbness, tingling, weakness. Ambulates with use a cane at  baseline due to her pain. Denies hx of CP, MI, stroke, seizures,

## 2024-08-03 LAB
A1C WITH ESTIMATED AVERAGE GLUCOSE RESULT: 6.7 % — HIGH (ref 4–5.6)
ADD ON TEST-SPECIMEN IN LAB: SIGNIFICANT CHANGE UP
ANION GAP SERPL CALC-SCNC: 10 MMOL/L — SIGNIFICANT CHANGE UP (ref 5–17)
ANION GAP SERPL CALC-SCNC: 12 MMOL/L — SIGNIFICANT CHANGE UP (ref 5–17)
ANION GAP SERPL CALC-SCNC: 12 MMOL/L — SIGNIFICANT CHANGE UP (ref 5–17)
BUN SERPL-MCNC: 16 MG/DL — SIGNIFICANT CHANGE UP (ref 7–23)
BUN SERPL-MCNC: 17 MG/DL — SIGNIFICANT CHANGE UP (ref 7–23)
BUN SERPL-MCNC: 19 MG/DL — SIGNIFICANT CHANGE UP (ref 7–23)
CALCIUM SERPL-MCNC: 9 MG/DL — SIGNIFICANT CHANGE UP (ref 8.4–10.5)
CALCIUM SERPL-MCNC: 9 MG/DL — SIGNIFICANT CHANGE UP (ref 8.4–10.5)
CALCIUM SERPL-MCNC: 9.1 MG/DL — SIGNIFICANT CHANGE UP (ref 8.4–10.5)
CHLORIDE SERPL-SCNC: 98 MMOL/L — SIGNIFICANT CHANGE UP (ref 96–108)
CHLORIDE SERPL-SCNC: 98 MMOL/L — SIGNIFICANT CHANGE UP (ref 96–108)
CHLORIDE SERPL-SCNC: 99 MMOL/L — SIGNIFICANT CHANGE UP (ref 96–108)
CO2 SERPL-SCNC: 24 MMOL/L — SIGNIFICANT CHANGE UP (ref 22–31)
CO2 SERPL-SCNC: 25 MMOL/L — SIGNIFICANT CHANGE UP (ref 22–31)
CO2 SERPL-SCNC: 26 MMOL/L — SIGNIFICANT CHANGE UP (ref 22–31)
CREAT SERPL-MCNC: 0.49 MG/DL — LOW (ref 0.5–1.3)
CREAT SERPL-MCNC: 0.52 MG/DL — SIGNIFICANT CHANGE UP (ref 0.5–1.3)
CREAT SERPL-MCNC: 0.62 MG/DL — SIGNIFICANT CHANGE UP (ref 0.5–1.3)
EGFR: 93 ML/MIN/1.73M2 — SIGNIFICANT CHANGE UP
EGFR: 97 ML/MIN/1.73M2 — SIGNIFICANT CHANGE UP
EGFR: 99 ML/MIN/1.73M2 — SIGNIFICANT CHANGE UP
ESTIMATED AVERAGE GLUCOSE: 146 MG/DL — HIGH (ref 68–114)
GLUCOSE BLDC GLUCOMTR-MCNC: 178 MG/DL — HIGH (ref 70–99)
GLUCOSE BLDC GLUCOMTR-MCNC: 211 MG/DL — HIGH (ref 70–99)
GLUCOSE BLDC GLUCOMTR-MCNC: 225 MG/DL — HIGH (ref 70–99)
GLUCOSE BLDC GLUCOMTR-MCNC: 255 MG/DL — HIGH (ref 70–99)
GLUCOSE SERPL-MCNC: 166 MG/DL — HIGH (ref 70–99)
GLUCOSE SERPL-MCNC: 209 MG/DL — HIGH (ref 70–99)
GLUCOSE SERPL-MCNC: 229 MG/DL — HIGH (ref 70–99)
HCT VFR BLD CALC: 36.1 % — SIGNIFICANT CHANGE UP (ref 34.5–45)
HGB BLD-MCNC: 11.8 G/DL — SIGNIFICANT CHANGE UP (ref 11.5–15.5)
MAGNESIUM SERPL-MCNC: 1.4 MG/DL — LOW (ref 1.6–2.6)
MCHC RBC-ENTMCNC: 28.6 PG — SIGNIFICANT CHANGE UP (ref 27–34)
MCHC RBC-ENTMCNC: 32.7 GM/DL — SIGNIFICANT CHANGE UP (ref 32–36)
MCV RBC AUTO: 87.6 FL — SIGNIFICANT CHANGE UP (ref 80–100)
NRBC # BLD: 0 /100 WBCS — SIGNIFICANT CHANGE UP (ref 0–0)
PLATELET # BLD AUTO: 284 K/UL — SIGNIFICANT CHANGE UP (ref 150–400)
POTASSIUM SERPL-MCNC: 2.9 MMOL/L — CRITICAL LOW (ref 3.5–5.3)
POTASSIUM SERPL-MCNC: 3.3 MMOL/L — LOW (ref 3.5–5.3)
POTASSIUM SERPL-MCNC: 3.5 MMOL/L — SIGNIFICANT CHANGE UP (ref 3.5–5.3)
POTASSIUM SERPL-SCNC: 2.9 MMOL/L — CRITICAL LOW (ref 3.5–5.3)
POTASSIUM SERPL-SCNC: 3.3 MMOL/L — LOW (ref 3.5–5.3)
POTASSIUM SERPL-SCNC: 3.5 MMOL/L — SIGNIFICANT CHANGE UP (ref 3.5–5.3)
RBC # BLD: 4.12 M/UL — SIGNIFICANT CHANGE UP (ref 3.8–5.2)
RBC # FLD: 12.9 % — SIGNIFICANT CHANGE UP (ref 10.3–14.5)
SODIUM SERPL-SCNC: 134 MMOL/L — LOW (ref 135–145)
SODIUM SERPL-SCNC: 135 MMOL/L — SIGNIFICANT CHANGE UP (ref 135–145)
SODIUM SERPL-SCNC: 135 MMOL/L — SIGNIFICANT CHANGE UP (ref 135–145)
WBC # BLD: 18.39 K/UL — HIGH (ref 3.8–10.5)
WBC # FLD AUTO: 18.39 K/UL — HIGH (ref 3.8–10.5)

## 2024-08-03 PROCEDURE — 99233 SBSQ HOSP IP/OBS HIGH 50: CPT

## 2024-08-03 RX ORDER — POTASSIUM CHLORIDE 1500 MG/1
10 TABLET, EXTENDED RELEASE ORAL
Refills: 0 | Status: COMPLETED | OUTPATIENT
Start: 2024-08-03 | End: 2024-08-03

## 2024-08-03 RX ORDER — MAGNESIUM, ALUMINUM HYDROXIDE 200-225/5
30 SUSPENSION, ORAL (FINAL DOSE FORM) ORAL EVERY 4 HOURS
Refills: 0 | Status: DISCONTINUED | OUTPATIENT
Start: 2024-08-03 | End: 2024-08-06

## 2024-08-03 RX ORDER — MAGNESIUM SULFATE 500 MG/ML
2 VIAL (ML) INJECTION ONCE
Refills: 0 | Status: COMPLETED | OUTPATIENT
Start: 2024-08-03 | End: 2024-08-03

## 2024-08-03 RX ORDER — CEFPODOXIME PROXETIL 100 MG
200 TABLET ORAL EVERY 12 HOURS
Refills: 0 | Status: DISCONTINUED | OUTPATIENT
Start: 2024-08-03 | End: 2024-08-06

## 2024-08-03 RX ORDER — FAMOTIDINE 40 MG/1
20 TABLET, FILM COATED ORAL DAILY
Refills: 0 | Status: DISCONTINUED | OUTPATIENT
Start: 2024-08-03 | End: 2024-08-06

## 2024-08-03 RX ORDER — POTASSIUM CHLORIDE 1500 MG/1
40 TABLET, EXTENDED RELEASE ORAL EVERY 4 HOURS
Refills: 0 | Status: COMPLETED | OUTPATIENT
Start: 2024-08-03 | End: 2024-08-03

## 2024-08-03 RX ADMIN — OXYCODONE HYDROCHLORIDE 5 MILLIGRAM(S): 30 TABLET ORAL at 18:25

## 2024-08-03 RX ADMIN — Medication 100 MILLIGRAM(S): at 03:57

## 2024-08-03 RX ADMIN — Medication 1000 MILLIGRAM(S): at 22:10

## 2024-08-03 RX ADMIN — OXYCODONE HYDROCHLORIDE 5 MILLIGRAM(S): 30 TABLET ORAL at 23:39

## 2024-08-03 RX ADMIN — POTASSIUM CHLORIDE 40 MILLIEQUIVALENT(S): 1500 TABLET, EXTENDED RELEASE ORAL at 21:11

## 2024-08-03 RX ADMIN — Medication 25 GRAM(S): at 13:47

## 2024-08-03 RX ADMIN — Medication 1000 MILLIGRAM(S): at 06:18

## 2024-08-03 RX ADMIN — POTASSIUM CHLORIDE 100 MILLIEQUIVALENT(S): 1500 TABLET, EXTENDED RELEASE ORAL at 10:45

## 2024-08-03 RX ADMIN — Medication 81 MILLIGRAM(S): at 18:25

## 2024-08-03 RX ADMIN — FAMOTIDINE 20 MILLIGRAM(S): 40 TABLET, FILM COATED ORAL at 18:25

## 2024-08-03 RX ADMIN — OXYCODONE HYDROCHLORIDE 5 MILLIGRAM(S): 30 TABLET ORAL at 04:03

## 2024-08-03 RX ADMIN — POTASSIUM CHLORIDE 40 MILLIEQUIVALENT(S): 1500 TABLET, EXTENDED RELEASE ORAL at 18:25

## 2024-08-03 RX ADMIN — Medication 1 TABLET(S): at 10:45

## 2024-08-03 RX ADMIN — Medication 4: at 18:21

## 2024-08-03 RX ADMIN — Medication 500 MILLIGRAM(S): at 05:17

## 2024-08-03 RX ADMIN — OXYCODONE HYDROCHLORIDE 5 MILLIGRAM(S): 30 TABLET ORAL at 22:39

## 2024-08-03 RX ADMIN — CELECOXIB 200 MILLIGRAM(S): 200 CAPSULE ORAL at 06:17

## 2024-08-03 RX ADMIN — Medication 1000 MILLIGRAM(S): at 21:10

## 2024-08-03 RX ADMIN — Medication 81 MILLIGRAM(S): at 05:17

## 2024-08-03 RX ADMIN — Medication 1000 MILLIGRAM(S): at 05:18

## 2024-08-03 RX ADMIN — OXYCODONE HYDROCHLORIDE 5 MILLIGRAM(S): 30 TABLET ORAL at 05:03

## 2024-08-03 RX ADMIN — Medication 2: at 22:39

## 2024-08-03 RX ADMIN — ATORVASTATIN CALCIUM 80 MILLIGRAM(S): 40 TABLET, FILM COATED ORAL at 21:10

## 2024-08-03 RX ADMIN — Medication 4: at 12:12

## 2024-08-03 RX ADMIN — Medication 1000 MILLIGRAM(S): at 13:47

## 2024-08-03 RX ADMIN — Medication 200 MILLIGRAM(S): at 18:24

## 2024-08-03 RX ADMIN — PANTOPRAZOLE SODIUM 40 MILLIGRAM(S): 20 TABLET, DELAYED RELEASE ORAL at 05:17

## 2024-08-03 RX ADMIN — POTASSIUM CHLORIDE 100 MILLIEQUIVALENT(S): 1500 TABLET, EXTENDED RELEASE ORAL at 08:44

## 2024-08-03 RX ADMIN — OXYCODONE HYDROCHLORIDE 5 MILLIGRAM(S): 30 TABLET ORAL at 19:25

## 2024-08-03 RX ADMIN — Medication 6: at 06:59

## 2024-08-03 RX ADMIN — OXYCODONE HYDROCHLORIDE 10 MILLIGRAM(S): 30 TABLET ORAL at 11:51

## 2024-08-03 RX ADMIN — OXYCODONE HYDROCHLORIDE 10 MILLIGRAM(S): 30 TABLET ORAL at 10:51

## 2024-08-03 RX ADMIN — CELECOXIB 200 MILLIGRAM(S): 200 CAPSULE ORAL at 05:17

## 2024-08-03 RX ADMIN — POTASSIUM CHLORIDE 100 MILLIEQUIVALENT(S): 1500 TABLET, EXTENDED RELEASE ORAL at 06:42

## 2024-08-03 RX ADMIN — GABAPENTIN 300 MILLIGRAM(S): 400 CAPSULE ORAL at 21:11

## 2024-08-03 RX ADMIN — Medication 30 MILLILITER(S): at 12:12

## 2024-08-03 RX ADMIN — CELECOXIB 200 MILLIGRAM(S): 200 CAPSULE ORAL at 18:25

## 2024-08-03 NOTE — OCCUPATIONAL THERAPY INITIAL EVALUATION ADULT - MODIFIED CLINICAL TEST OF SENSORY INTEGRATION IN BALANCE TEST
Pt able to ambulate 100 feet with CGA using RW, demonstrating fairly steady gait, difficulty advancing RLE, and decreased step length.

## 2024-08-03 NOTE — DISCHARGE NOTE PROVIDER - HOSPITAL COURSE
Admit to Orthopaedics  Surgery right total hip replacement, posterior approach on 8/2  Perioperative Antibiotics  DVT prophylaxis  Physical Therapy and out of bed to chair  Pain Regimen  Medicine Comanagement Admit to Orthopaedics  Surgery right total hip replacement, posterior approach on 8/2  Perioperative Antibiotics  DVT prophylaxis  Physical Therapy and out of bed to chair  Pain Regimen  Medicine Comanagement   Medical Course:  08-02: OR; amb 25 ft in PACU  8/3: K 2.9 - IV KCl x3 doses started, added on Mg [x],   8/4: NADYA, still Home vs [facility] split plan  8/5- will probably clear this AM; change aquacel before dc , plan for home needs to be discharged from pt (   )  8/6 - drowsy w/ lipitor d/c'd , meds sent to [facility], cleared pt, aquaceks changed       Admit to Orthopaedics  Surgery right total hip replacement, posterior approach on 8/2  Perioperative Antibiotics  DVT prophylaxis  Physical Therapy and out of bed to chair  Pain Regimen  Medicine Comanagement   Medical Course:  08-02: OR; amb 25 ft in PACU  8/3: K 2.9 - IV KCl x3 doses started, added on Mg [x],   8/4: NADYA, still Home vs [facility] split plan  8/5- will probably clear this AM; change aquacel before dc , plan for home needs to be discharged from pt (   )  8/6 - drowsy w/ lipitor d/c'd , meds sent to [facility], cleared pt, aquacels changed

## 2024-08-03 NOTE — DISCHARGE NOTE PROVIDER - CARE PROVIDER_API CALL
Lesli Pires  Orthopaedic Surgery  [address] [place] [address]  Phone: [phone]  Fax: [phone]  Follow Up Time:

## 2024-08-03 NOTE — OCCUPATIONAL THERAPY INITIAL EVALUATION ADULT - DIAGNOSIS, OT EVAL
Pt POD1 admitted for right THR (posterior) and presents with impaired balance, strength, and decreased activity tolerance.

## 2024-08-03 NOTE — DISCHARGE NOTE PROVIDER - NSDCCPCAREPLAN_GEN_ALL_CORE_FT
PRINCIPAL DISCHARGE DIAGNOSIS  Diagnosis: OA (osteoarthritis)  Assessment and Plan of Treatment:      PRINCIPAL DISCHARGE DIAGNOSIS  Diagnosis: OA (osteoarthritis)  Assessment and Plan of Treatment: R THR

## 2024-08-03 NOTE — CONSULT NOTE ADULT - ASSESSMENT
74 year old female with history of HLD, DM, HTN, chronic hip pain, now s/p POWER of the right hip on 8/2.    # post-op status  s/p POWER  - management per ortho primary team  - pain control, well controlled  - PT/OT  - encourage oob 2 chair  - encourage use of incentive spirometer  - DVT ppx    # GERD symptoms  - recommend starting Famotidine 20 daily    # DM  - iss inpatient, cc diet  - holding metformin inpatient    # HTN  per history.  - normotensive inpatient  - hold antihypertensive agents for now    # HLD  - c/w Rosuvastatin 40    F: s/p LR  E: Replete electrolytes as needed, K>4, M>2  N: cc diet  DVT ppx: on asa 81 BID, SCDs, encourage ambulation  GI ppx: recommend start famoditine  Dispo: recommended for [facility]    CODE STATUS: Full Code

## 2024-08-03 NOTE — CONSULT NOTE ADULT - SUBJECTIVE AND OBJECTIVE BOX
HPI:  Patient is a 74F with right hip pain since 2021 that has been worsening over the past 6 months that has not improved. Pt has failed conservative treatments, including steroid injections. Pt stated that she last had her injections in May, which prevented her from having surgery done sooner than she had hoped. The pain is constant an daily, worse at night and when walking long distances. Pt endorses trying Ibuprofen, tramadol, diclofenac, which did not help her pain. She also takes tylenol, which does help her and she takes it only as needed. Pt states that the pain has been limit her daily activities and now she can no longer walk city blocks like she use to. Denies numbness, tingling, weakness. Ambulates with use a cane at  baseline due to her pain. Denies hx of CP, MI, stroke, seizures, n/v,    Now s/p Right POWER on 8/2.    SUBJECTIVE:  She reports ongoing gerd/reflux symptoms whicvh make it uncomfortable to eat. Her pain is adequtely controlled. She is waiting for PT/OT today.  No bm but passing gas. Reports concern about glucose being high and about receiving insulin (not on insulin outpatient), explained about iss for inpatient.  Urinating.  No chest pain, leg swelling, n/v. Rest of 12 point ROS negative, except where noted above.     MEDICATIONS:  MEDICATIONS  (STANDING):  acetaminophen     Tablet .. 1000 milliGRAM(s) Oral every 8 hours  aspirin  chewable 81 milliGRAM(s) Oral two times a day  atenolol  Tablet 100 milliGRAM(s) Oral daily  atorvastatin 80 milliGRAM(s) Oral at bedtime  cefpodoxime 200 milliGRAM(s) Oral every 12 hours  celecoxib 200 milliGRAM(s) Oral every 12 hours  dextrose 5%. 1000 milliLiter(s) (100 mL/Hr) IV Continuous <Continuous>  dextrose 5%. 1000 milliLiter(s) (50 mL/Hr) IV Continuous <Continuous>  dextrose 50% Injectable 12.5 Gram(s) IV Push once  dextrose 50% Injectable 25 Gram(s) IV Push once  dextrose 50% Injectable 25 Gram(s) IV Push once  gabapentin 300 milliGRAM(s) Oral at bedtime  glucagon  Injectable 1 milliGRAM(s) IntraMuscular once  insulin lispro (ADMELOG) corrective regimen sliding scale   SubCutaneous Before meals and at bedtime  lactated ringers. 1000 milliLiter(s) (75 mL/Hr) IV Continuous <Continuous>  magnesium sulfate  IVPB 2 Gram(s) IV Intermittent once  multivitamin 1 Tablet(s) Oral daily  pantoprazole    Tablet 40 milliGRAM(s) Oral before breakfast  senna 2 Tablet(s) Oral at bedtime    MEDICATIONS  (PRN):  aluminum hydroxide/magnesium hydroxide/simethicone Suspension 30 milliLiter(s) Oral every 4 hours PRN Dyspepsia  dextrose Oral Gel 15 Gram(s) Oral once PRN Blood Glucose LESS THAN 70 milliGRAM(s)/deciliter  HYDROmorphone  Injectable 0.5 milliGRAM(s) IV Push every 3 hours PRN Breakthrough pain  HYDROmorphone  Injectable 0.5 milliGRAM(s) IV Push every 15 minutes PRN breakthrough pacu  ondansetron Injectable 4 milliGRAM(s) IV Push every 6 hours PRN Nausea and/or Vomiting  oxyCODONE    IR 5 milliGRAM(s) Oral every 3 hours PRN Moderate Pain (4 - 6)  oxyCODONE    IR 10 milliGRAM(s) Oral every 3 hours PRN Severe Pain (7 - 10)      Allergies    No Known Allergies    Intolerances        OBJECTIVE:  Vital Signs Last 24 Hrs  T(C): 36.4 (03 Aug 2024 08:45), Max: 36.4 (02 Aug 2024 15:17)  T(F): 97.6 (03 Aug 2024 08:45), Max: 97.6 (03 Aug 2024 05:34)  HR: 75 (03 Aug 2024 08:45) (62 - 86)  BP: 105/69 (03 Aug 2024 08:45) (96/50 - 117/69)  BP(mean): 81 (03 Aug 2024 08:45) (68 - 85)  RR: 16 (03 Aug 2024 08:45) (11 - 22)  SpO2: 95% (03 Aug 2024 08:45) (90% - 100%)    Parameters below as of 03 Aug 2024 08:45  Patient On (Oxygen Delivery Method): room air      I&O's Summary    02 Aug 2024 07:01  -  03 Aug 2024 07:00  --------------------------------------------------------  IN: 0 mL / OUT: 200 mL / NET: -200 mL      PHYSICAL EXAM:  General: NAD, overweight, resting in bed  HEENT: NC/AT; PERRL, clear conjunctiva  Neck: supple  Respiratory: CTA b/l, incentive spirometer at bedside  Cardiovascular: +S1/S2; RRR  Abdomen: soft, NT/ND; +BS x4  Extremities: 2+ peripheral pulses b/l;   Skin: normal color and turgor; no rash  Neurological: AAO x 3. no FND.  Psychiatry: appropriate mood/affect       LABS:                        11.8   18.39 )-----------( 284      ( 03 Aug 2024 05:30 )             36.1     08-03    134<L>  |  98  |  17  ----------------------------<  229<H>  3.3<L>   |  24  |  0.49<L>    Ca    9.0      03 Aug 2024 12:00  Mg     1.4     08-03          CAPILLARY BLOOD GLUCOSE      POCT Blood Glucose.: 225 mg/dL (03 Aug 2024 11:30)  POCT Blood Glucose.: 255 mg/dL (03 Aug 2024 06:53)  POCT Blood Glucose.: 185 mg/dL (02 Aug 2024 22:23)  POCT Blood Glucose.: 162 mg/dL (02 Aug 2024 17:31)    Urinalysis Basic - ( 03 Aug 2024 12:00 )    Color: x / Appearance: x / SG: x / pH: x  Gluc: 229 mg/dL / Ketone: x  / Bili: x / Urobili: x   Blood: x / Protein: x / Nitrite: x   Leuk Esterase: x / RBC: x / WBC x   Sq Epi: x / Non Sq Epi: x / Bacteria: x        MICRODATA:      RADIOLOGY/OTHER STUDIES:

## 2024-08-03 NOTE — OCCUPATIONAL THERAPY INITIAL EVALUATION ADULT - GENERAL OBSERVATIONS, REHAB EVAL
Pt received semi-supine in bed, +heplock, +b/l SCDs +primafit, NAD, and agreeable to OT. Cleared by Abby to see.

## 2024-08-03 NOTE — OCCUPATIONAL THERAPY INITIAL EVALUATION ADULT - ADDITIONAL COMMENTS
Pt states she will be staying in [place], no [place] or FOS. Pt reports being independent with ADLs, IADLs, and mobility tasks, use of SC.

## 2024-08-03 NOTE — DISCHARGE NOTE PROVIDER - NSDCFUADDINST_GEN_ALL_CORE_FT
ACTIVITY:   - Weight bear as tolerated with assistive device and posterior hip precautions. No strenuous activity, heavy lifting, driving or returning to work until cleared by MD.   - Apply a cold compress to the surgical site several times daily to reduce pain and swelling. For icing, twenty-minute sessions followed by an hour off is recommended. You should ice as frequently as possible. Ice should NEVER be placed directly on the skin.       (POSTERIOR HIP REPLACEMENTS)   Hip precautions:   The following precautions will remain in effect for 6 weeks following surgery:   · Do not cross your knees.   · Do not flex your hip (i.e., bring your knee toward your chest) beyond 90 degrees.   · Use a raised toilet seat. Do not use low chairs or couches.   · Sleep with a pillow between your knees.   · Do not reach down to  items on the floor.      DRESSING/SHOWERING:   (AQUACEL – brown gel dressing)   - You may shower, your dressing is water-resistant. Do not soak in bathtubs. Remove dressing after postop day 7, then leave incision open to air. You may do this yourself (simply peel it off).   *** under the Aquacel you have PRINEO (mesh like dressing)***  You may take showers. Your dressing is water resistant. Your may let clean, soapy running water fall over incision and pat dry.  Do not soak your incision in sitting water (ie tubs, pools, lakes, etc.) until cleared by your suregon.  Leave incision open to air. Keep incision clean and dry. Do not remove and/or pick at the dressing/tape overlying your incision.  Do not apply creams, ointments or oils to your incision until cleared by your surgeon.     MEDICATION/ANTICOAGULATION:   -You have been prescribed Aspirin 81mg every 12 hours, as a preventative to help prevent postoperative blood clots. Please take this medication as prescribed.    - You have been prescribed medications for pain:     - Tylenol for mild to moderate pain. Do not exceed 3,000mg daily.     - For more severe pain, take Tylenol with the addition of narcotic pain medication. Take this medication as prescribed. This medication may cause drowsiness or dizziness. Do not operate machinery. This medication may cause constipation.   - For any additional medications, follow instructions on the bottle.    -Try to have regular bowel movements. Take stool softener or laxative if necessary. You may wish to take Miralax daily until you have regular bowel movements.    - If you have been prescribed Aspirin or an anti-inflammatory, please take prilosec (omeprazole) once a day, before breakfast, until no longer taking Aspirin or anti-inflammatory. This will help protect your stomach.   - If you have a pain management physician, please follow-up with them postoperatively.    - If you experience any negative side effects of your medications, please call your surgeon's office to discuss.      FOLLOW-UP:   - Call to schedule an appt with Dr. Pires for follow up.   - Please follow-up with your primary care physician or any other specialist you see postoperatively, if needed.    - Contact your doctor or go to the emergency room if you experience: fever greater than 101.5, chills, chest pain, difficulty breathing, redness or excessive drainage around the incision, other concerns.  Post-Operative Total Hip Arthroplasty (POWER) Instructions:   What to do if you are concerned about your symptoms after discharge:   ? Call the office if you develop fevers (over 101) or chills, develop drainage from the site of the wound, or redness   surrounding the wound.   ? [facility] patients: call [phone]  ? [facility] patients: call [phone]  ? [facility] patients: call [phone]   ? Go to the emergency room if you develop chest pain or shortness of breath.   ? If you need a refill of pain medication, or need to speak with your surgeon, please try to call during normal business   hours, and preferably earlier in the week, so we can get to your request ASAP.  When to follow up with your surgeon:  ? 2-3 weeks following your discharge from the hospital. If you do not have this appointment, please call the office.   ? 6-8 weeks after surgery  ? 4 months after surgery  ? 1 year after surgery  ? Then once every 1-2 years  Activity:   ? Maintain your hip precautions for at least 6 weeks.  ? You can put all of your weight on your operated leg.  ? You can sleep in any position you would like. Place a pillow between your legs to avoid hip dislocation for the first 6   weeks.  ? There are no specific restrictions after surgery, but certain activities may be more difficult including sports. Most   patients are back to most of their normal daily activities 4-6 weeks after surgery, and return to sports is typically   between 2 and 4 months depending on the person and overall fitness level.  ? You may resume sexual activity when you are comfortable. For more information and education about resuming   sexual activity, use your smartphone to scan the QR code below:  Pain Control:  ? Oxycodone: You can take 1-2 tabs (5-10mg) every 4-6 hours, as needed. Some patients may need to take the   maximum amount (i.e. 2 tabs every 4 hours) for a few days.  ? If oxycodone is not working, please call the office and we can try a different narcotic pain medication.  ? Common side effects are constipation, nausea, drowsiness, dizziness, headache, dry mouth, and itching.   Some patients also experience night sweats.  ? Opioids can be dangerous if taken with alcohol, sleeping pills, or benzodiazepines such as valium, ativan etc.  ¦ **DO NOT mix pain medications with alcohol.   ¦ **DO NOT drive while taking narcotics.   ? Celebrex: For inflammation - take twice daily.  ? Acetaminophen: Take 1000 mg every 8 hours.  ? Maximum dose of acetaminophen is 4000mg daily.  ? Gabapentin: For nerve-related pain, as your nerves may have inflammation after surgery. This medication can be a   little sedating, so only take this at night.  ? If you are having a lot of nerve-like pain (this tends to feel like “burning”, “stinging” or “electricity”), the   gabapentin dose can be increased, please call the office to discuss.  Medications:  ? Antibiotics: You do not need any antibiotics after surgery.  ? Aspirin: Baby aspirin (81mg) twice daily to prevent blood clots for 30 days. If you are already taking a baby aspirin   once daily, you can resume this schedule on day 31  ? If unable to tolerate aspirin, you will be prescribed apixaban (Eliquis) instead.  ? Pantoprazole: Take once daily while on aspirin (30 days) to protect your stomach lining.  ? Ondansetron: For nausea, take every 6 hours as needed.  ? In cases of narcotic-related nausea, take one tablet 30-60 min BEFORE taking the pain pill(s).  ? Laxatives: Take these while on narcotic pain medications, as narcotics commonly cause constipation. These   medications can all be purchased over the counter if a prescription is not provided.  ? Colace: 200mg (2 caplets) every evening, increase to 300mg (3 caplets) per day if needed.  ? Senna: 17.2mg (2 tabs) every evening, increase to 3 tabs per day if needed.  ? MiraLax (polyethylene glycol 3350): 1 packet (17g) every morning, diluted in water, iced tea or juice.   Increase to twice daily if needed.  ? Dulcolax (Bisacodyl) suppositories: Use if no bowel movement by post-surgical day #4.  ? Magnesium citrate: Alternative to suppositories. Take ½ a bottle (approximately 150mL or 5 oz). If no   results, take the other ½ bottle the following day  ¦ If you have kidney disease, do not use magnesium citrate.  NSAIDs (i.e. Motrin, ibuprofen, Advil, Aleve, naproxen, meloxicam, diclofenac): Do not take while on Celebrex - they work   very similarly. After you have completed Celebrex, resume taking any NSAIDs you would like.  ? If Celebrex is not covered by your insurance, take naproxen 500mg BID for 21 days instead.  ? Do not take NSAIDs or Celebrex if you are prescribed apixaban (Eliquis) as it may increase your bleeding risk.  Swelling:  ? Use ice to help control the swelling and pain.   ? Ensure you are not placing ice directly on your skin - skin sensation will not be normal immediately after   surgery and you may burn yourself.  ? Cryotherapy units are not always covered by insurance - can also purchase online (i.e. [employer]) or use ice packs.  ? DO NOT USE HEAT – this will increase the swelling.   ? Bruising around the incision, into the thigh or leg are very common. This should begin to resolve within 2 weeks.  You may have swelling and warmth about the hip for 6 weeks after surgery. This will gradually decrease, but may   take 6 months to 1 year for the swelling to resolve completely.  Numbness:  ? Very common after surgery because the skin incision disrupts multiple nerve fibers that give sensation to your skin.  ? Tingling or pins and needles sensations are normal as the nerves begin to heal.  Wound Care:   ? You may shower on the second day after surgery. The surgical dressing is water resistant, you do not need to keep   it covered while you shower.   ? Remove the outer dressing on post-surgical day #7. There is another dressing underneath which will fall off on its   own with time, you do not need to remove.  ? Do not bathe, swim, or jacuzzi (i.e. do not submerge the incision) until cleared by your surgeon.  ? Do not use ointments or creams on the incision. OK to use lotions on the skin around the dressing only.   Diet: There is no specific recommendation for post-surgery diets, but we typically suggest eating healthy, taking a   multivitamin, and making sure to drink lots of fluids. Some of the medications after surgery are sedating and can cause   dizziness, and staying hydrated can help with some of those symptoms.  Driving: Doctors do not have a license with the [facility] to “clear you” or “release you” to return to driving, but we can guide   you in your decision-making.  ? To return to driving, you need to be off of narcotic pain medicines.  ? Average time to driving is around 4 weeks but depends on the person. For left side surgery, some people do return   to driving earlier at around 2 weeks (unless you have a clutch).  ? I recommend testing yourself in an empty parking lot or a slow side street to ensure your braking reflexes are   appropriate, before driving in traffic or on the highway.  Return to work:  ? Estimated return to work varies depending on the demands of your job.   ? Although some ambitious patients return to desk jobs / administrative type work as early as 1 week after surgery,   average time to return to work is usually around 4-6 weeks.  ? For active labor or heavy labor, it may take 3 to 6 months to return to work.  Restart all home medications, Follow Dr. Gonzalez discharge instructions on the sheet provided. Follow up with your PCP in 1 week if your sore throat when swallowing food does not improve;.

## 2024-08-03 NOTE — OCCUPATIONAL THERAPY INITIAL EVALUATION ADULT - PHYSICAL ASSIST/NONPHYSICAL ASSIST:TOILET, OT EVAL
[facility]  EMERGENCY DEPARTMENT ENCOUNTER      Pt Name: Luis A Trevino  MRN: [id number]  [place] 1950  Date of evaluation: 8/29/2022  Provider: Carla Severino MD    CHIEF COMPLAINT     Chief Complaint   Patient presents with    Shoulder Pain     Left shoulder pain, was in PT, arthogram 1 week ago torn rotator cuff, Tuesday pain worsening, limited ROM, through shoulder, neck, head         HISTORY OF PRESENT ILLNESS   (Location/Symptom, Timing/Onset, Context/Setting,Quality, Duration, Modifying Factors, Severity)  Note limiting factors. Luis A Trevino is a 67 y.o. male who presents to the emergency department with chief complaint of severe left shoulder pain. Patient underwent CT arthrogram of the left shoulder 8 days ago and was diagnosed with a rotator cuff tear. The following day he developed severe pain that has progressively worsened. Pain radiates to surrounding structures. He denies chills or fever. The history is provided by the patient. Nursing Notes werereviewed. REVIEW OF SYSTEMS    (2-9 systems for level 4, 10 or more for level 5)     Review of Systems   All other systems reviewed and are negative. Except as noted above the remainder of the review of systems was reviewed and negative.        PAST MEDICAL HISTORY     Past Medical History:   Diagnosis Date    Bladder stone     BPH with obstruction/lower urinary tract symptoms     Caffeine use     4 coffee/2 soda per day    Cataracts, bilateral     Chronic back pain     COPD (chronic obstructive pulmonary disease) ([address]     Hypertension     Irritable bowel syndrome     Kidney stone     Osteoarthritis     Pancreatitis     Type II or unspecified type diabetes mellitus without mention of complication, not stated as uncontrolled          SURGICALHISTORY       Past Surgical History:   Procedure Laterality Date    BACK SURGERY      BLADDER SURGERY      CHOLECYSTECTOMY      CYSTOSCOPY  04/06/2022    CYSTOSCOPY URETEROSCOPY HOLMIUM LASER LEFT URETERAL STENT INSERTION - Left    CYSTOSCOPY Left 4/6/2022    CYSTOSCOPY URETEROSCOPY HOLMIUM LASER LEFT URETERAL STENT INSERTION performed by Douglas Nelson MD at [facility]  04/20/2022    CYSTOSCOPY URETEROSCOPY LEFT STENT PLACEMENT WITH STONE BASKETING AND HOLMIUM LASER (    CYSTOSCOPY Left 4/20/2022    CYSTOSCOPY URETEROSCOPY LEFT STENT PLACEMENT WITH STONE BASKETING AND HOLMIUM LASER performed by Douglas Nelson MD at [facility]  2015    macular scrape    EYE SURGERY      LITHOTRIPSY      9-21-17    OTHER SURGICAL HISTORY      implanted nerve stimulater in lumbar back    SPINE SURGERY  05/18/2010    DR. Aiken Shock Buttram (NS)    URETEROSCOPY      with string stent 9-21-17    VASECTOMY           CURRENT MEDICATIONS       Current Discharge Medication List        CONTINUE these medications which have NOT CHANGED    Details   baclofen (LIORESAL) 10 MG tablet Take 1 tablet by mouth 3 times daily as needed (muscle spasm)  Qty: 30 tablet, Refills: 0      ibuprofen (ADVIL;MOTRIN) 800 MG tablet Take 1 tablet by mouth every 6 hours as needed for Pain  Qty: 28 tablet, Refills: 0      predniSONE (DELTASONE) 20 MG tablet Take 1 tablet by mouth 2 times daily for 5 days  Qty: 10 tablet, Refills: 0      albuterol sulfate HFA (PROVENTIL;VENTOLIN;PROAIR) 108 (90 Base) MCG/ACT inhaler Inhale 2 puffs into the lungs every 6 hours as needed for Wheezing or Shortness of Breath (or take 1-2 puffs 30 mins prior to strenous activity)  Qty: 18 g, Refills: 6    Associated Diagnoses: Stage 1 mild COPD by GOLD classification ([address] Centrilobular emphysema (HCC)      celecoxib (CELEBREX) 200 MG capsule Take 1 capsule by mouth daily  Qty: 90 capsule, Refills: 1    Associated Diagnoses: Osteoarthritis of multiple joints, unspecified osteoarthritis type      gabapentin (NEURONTIN) 300 MG capsule Take 1 capsule by mouth 2 times daily for 180 days.  Intended supply: 90 days  Qty: 180 capsule, Refills: 1    Associated Diagnoses: Osteoarthritis of multiple joints, unspecified osteoarthritis type; Neuropathy      metFORMIN (GLUCOPHAGE) 1000 MG tablet Take 1 tablet by mouth 2 times daily (with meals)  Qty: 180 tablet, Refills: 1    Associated Diagnoses: Uncontrolled type 2 diabetes mellitus with diabetic polyneuropathy, with long-term current use of insulin ([facility])      lisinopril (PRINIVIL;ZESTRIL) 10 MG tablet Take 1 tablet by mouth daily  Qty: 90 tablet, Refills: 1    Associated Diagnoses: Essential hypertension      chlorthalidone (HYGROTON) 25 MG tablet Take 1 tablet by mouth daily  Qty: 90 tablet, Refills: 1    Associated Diagnoses: Essential hypertension      pioglitazone (ACTOS) 30 MG tablet Take 1 tablet by mouth daily  Qty: 90 tablet, Refills: 1    Associated Diagnoses: Uncontrolled type 2 diabetes mellitus with diabetic polyneuropathy, with long-term current use of insulin ([facility])      DULoxetine (CYMBALTA) 30 MG extended release capsule Take 1 capsule by mouth 2 times daily  Qty: 180 capsule, Refills: 3    Associated Diagnoses: Depression, unspecified depression type; Chronic pain due to trauma      insulin detemir (LEVEMIR FLEXTOUCH) 100 UNIT/ML injection pen Inject 63 Units into the skin nightly  Qty: 60 mL, Refills: 3      blood glucose test strips (ONETOUCH VERIO) strip Test three times daily and as needed  Qty: 300 strip, Refills: 3    Associated Diagnoses: Uncontrolled type 2 diabetes mellitus with diabetic polyneuropathy, with long-term current use of insulin ([facility])      Insulin Pen Needle 31G X 5 MM MISC 1 each by Does not apply route daily  Qty: 100 each, Refills: 3      Lancets MISC Test twice a day and as needed, Please send lancets for Verio brand meter  Qty: 180 each, Refills: 2      multivitamin (THERAGRAN) per tablet Take 1 tablet by mouth daily. ALLERGIES     Patient has no known allergies.     FAMILY HISTORY       Family History   Problem Relation Age of Onset    Arthritis Mother     Cancer Father           SOCIAL HISTORY       Social History     Socioeconomic History    Marital status:    Tobacco Use    Smoking status: Some Days     Packs/day: 1.00     Years: 40.00     Pack years: 40.00     Types: Cigarettes     Start date: 3/3/1980    Smokeless tobacco: Former    Tobacco comments:     now smoking 1-3 cigrattes on and off    Vaping Use    Vaping Use: Former    Quit date: 3/17/2019   Substance and Sexual Activity    Alcohol use: Not Currently     Comment: occasional    Drug use: No    Sexual activity: Not Currently     Social Determinants of Health     Financial Resource Strain: Low Risk     Difficulty of Paying Living Expenses: Not hard at all   Food Insecurity: No Food Insecurity    Worried About [address] in the Last Year: Never true    [address] [employer] in the Last Year: Never true       SCREENINGS    Lolita Coma Scale  Eye Opening: Spontaneous  Best Verbal Response: Oriented  Best Motor Response: Obeys commands  Shashank Coma Scale Score: 15        PHYSICAL EXAM    (up to 7 for level 4, 8 or more for level 5)     ED Triage Vitals [08/29/22 0940]   BP Temp Temp Source Heart Rate Resp SpO2 Height Weight   134/71 98 °F (36.7 °C) Oral 89 16 95 % 6' 2\" (1.88 m) 200 lb (90.7 kg)       Physical Exam  Vitals reviewed. Constitutional:       General: He is in acute distress. Appearance: He is not ill-appearing. HENT:      Head: Normocephalic. Right Ear: External ear normal.      Left Ear: External ear normal.      Nose: Nose normal.      Mouth/Throat:      Mouth: Mucous membranes are moist.   Eyes:      Extraocular Movements: Extraocular movements intact. Cardiovascular:      Rate and Rhythm: Normal rate and regular rhythm. Heart sounds: Normal heart sounds. Pulmonary:      Effort: Pulmonary effort is normal.      Breath sounds: Normal breath sounds. Abdominal:      Palpations: Abdomen is soft.       Tenderness: There is no abdominal tenderness. Musculoskeletal:      Cervical back: Neck supple. Comments: Palpation of the left shoulder reveals some fullness compared to the right and there is exquisite underlying tenderness. Range of motion is very limited because of the pain. There is no overlying redness or warmth. Neurovascular function is intact distally. Skin:     General: Skin is warm and dry. Coloration: Skin is not pale. Neurological:      General: No focal deficit present. Mental Status: He is alert. DIAGNOSTIC RESULTS     EKG: All EKG's are interpreted by the Emergency Department Physician who either signs orCo-signs this chart in the absence of a cardiologist.    RADIOLOGY:     Interpretation per the Radiologist below, ifavailable at the time of this note:    XR SHOULDER LEFT (MIN 2 VIEWS)   Final Result   No acute osseous or soft tissue abnormality. ED BEDSIDE ULTRASOUND:   Performed by ED Physician - none    LABS:  Labs Reviewed   CBC WITH AUTO DIFFERENTIAL - Abnormal; Notable for the following components:       Result Value    WBC 12.4 (*)     RBC 4.30 (*)     Hemoglobin 13.3 (*)     Hematocrit 39.2 (*)     Seg Neutrophils 92 (*)     Lymphocytes 3 (*)     Eosinophils % 0 (*)     Segs Absolute 11.41 (*)     Absolute Lymph # 0.37 (*)     All other components within normal limits   BASIC METABOLIC PANEL - Abnormal; Notable for the following components:    Glucose 104 (*)     BUN 24 (*)     Potassium 3.5 (*)     Chloride 94 (*)     All other components within normal limits   C-REACTIVE PROTEIN - Abnormal; Notable for the following components:    .3 (*)     All other components within normal limits       All other labs were within normal range ornot returned as of this dictation.     EMERGENCY DEPARTMENT COURSE and DIFFERENTIAL DIAGNOSIS/MDM:   Vitals:    Vitals:    08/29/22 0940 08/29/22 1145 08/29/22 1154 08/29/22 1255   BP: 134/71  130/67    Pulse: 89  90    Resp: 16  16 16   Temp: 98 °F (36.7 °C)  97.7 °F (36.5 °C)    TempSrc: Oral  Oral    SpO2: 95%  92%    Weight: 90.7 kg (200 lb) 92.3 kg (203 lb 7.8 oz)     Height: 6' 2\" (1.88 m) 6' 2\" (1.88 m)              The white cell count and CRP are elevated. Patient is treated with IV fentanyl followed by IV Dilaudid for pain. Findings are discussed Dr. Loyola who is admitting him. MDM      CONSULTS:  IP CONSULT TO ORTHOPEDIC SURGERY    PROCEDURES:  Unlessotherwise noted below, none     Procedures    FINAL IMPRESSION      1. Acute pain of left shoulder    2. Intractable pain          DISPOSITION/PLAN   DISPOSITION Admitted 08/29/2022 11:11:31 AM      PATIENT REFERRED TO:  No follow-up provider specified.     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    Personal history of kidney stones Z87.442    Moderate episode of recurrent major depressive disorder ([facility]) F33.1    Epiretinal membrane H35.379    Renal cyst, left N28.1    Hypokalemia E87.6    Intractable pain R52           Summation      Patient Course: Admitted    ED Medicationsadministered this visit:    Medications   albuterol sulfate HFA (PROVENTIL;VENTOLIN;PROAIR) 108 (90 Base) MCG/ACT inhaler 2 puff (has no administration in time range)   baclofen (LIORESAL) tablet 10 mg (has no administration in time range)   celecoxib (CELEBREX) capsule 200 mg (has no administration in time range)   chlorthalidone (HYGROTON) tablet 25 mg (has no administration in time range)   DULoxetine (CYMBALTA) extended release capsule 30 mg (has no administration in time range)   gabapentin (NEURONTIN) capsule 300 mg (has no administration in time range)   insulin detemir (LEVEMIR) injection pen 63 Units (has no administration in time range)   lisinopril (PRINIVIL;ZESTRIL) tablet 10 mg (has no administration in time range)   metFORMIN (GLUCOPHAGE) tablet 1,000 mg (has no administration in time range)   pioglitazone (ACTOS) tablet 30 mg (has no administration in time range)   sodium chloride flush 0.9 % injection 5-40 mL (has no administration in time range)   sodium chloride flush 0.9 % injection 5-40 mL (has no administration in time range)   0.9 % sodium chloride infusion (has no administration in time range)   enoxaparin (LOVENOX) injection 40 mg (has no administration in time range)   ondansetron (ZOFRAN-ODT) disintegrating tablet 4 mg (has no administration in time range)     Or   ondansetron (ZOFRAN) injection 4 mg (has no administration in time range)   polyethylene glycol (GLYCOLAX) packet 17 g (has no administration in time range)   acetaminophen (TYLENOL) tablet 650 mg (has no administration in time range)     Or   acetaminophen (TYLENOL) suppository 650 mg (has no administration in time range)   morphine (PF) injection 1 mg ( IntraVENous See Alternative 8/29/22 1255)     Or   morphine (PF) injection 2 mg (2 mg IntraVENous Given 8/29/22 1255)   insulin lispro (HUMALOG) injection vial 0-4 Units (has no administration in time range)   insulin lispro (HUMALOG) injection vial 0-4 Units (has no administration in time range)   glucose chewable tablet 16 g (has no administration in time range)   dextrose bolus 10% 125 mL (has no administration in time range)     Or   dextrose bolus 10% 250 mL (has no administration in time range)   glucagon (rDNA) injection 1 mg (has no administration in time range)   dextrose 10 % infusion (has no administration in time range)   ondansetron (ZOFRAN) injection 4 mg (4 mg IntraVENous Given 8/29/22 1006)   fentaNYL (SUBLIMAZE) injection 75 mcg (75 mcg IntraVENous Given 8/29/22 1008)   HYDROmorphone (DILAUDID) injection 1 mg (1 mg IntraVENous Given 8/29/22 1056)       New Prescriptions from this visit:    Current Discharge Medication List        START taking these medications    Details   oxyCODONE-acetaminophen (PERCOCET) 5-325 MG per tablet Take 2 tablets by mouth every 6 hours as needed for Pain for up to 3 days. Intended supply: 3 days. Take lowest dose possible to manage pain  Qty: 24 tablet, Refills: 0    Comments: Reduce doses taken as pain becomes manageable  Associated Diagnoses: Intractable pain             Follow-up:  No follow-up provider specified. Final Impression:   1. Acute pain of left shoulder    2.  Intractable pain               (Please note that portions of this note were completed with a voice recognitionprogram.  Efforts were made to edit the dictations but occasionally words are mis-transcribed.)    Celso Segura MD (electronically signed)  Attending Emergency Physician           Celso Segura MD  08/29/22 3841 verbal cues/nonverbal cues (demo/gestures)/1 person assist

## 2024-08-03 NOTE — PROGRESS NOTE ADULT - SUBJECTIVE AND OBJECTIVE BOX
Ortho Progress Note    Procedure: RIGHT Posterior POWER   Surgeon: Dr. JOE Pires    Pt comfortable without complaints, pain controlled  Denies CP, SOB, N/V, numbness/tingling     Vital Signs Last 24 Hrs  T(C): 36.4 (08-03-24 @ 05:34), Max: 36.4 (08-03-24 @ 05:34)  T(F): 97.6 (08-03-24 @ 05:34), Max: 97.6 (08-03-24 @ 05:34)  HR: 75 (08-03-24 @ 05:34) (75 - 75)  BP: 117/69 (08-03-24 @ 05:34) (117/69 - 117/69)  BP(mean): 85 (08-03-24 @ 05:34) (85 - 85)  RR: 18 (08-03-24 @ 05:34) (18 - 18)  SpO2: 94% (08-03-24 @ 05:34) (94% - 94%)    General: Pt Alert and oriented, NAD  Aquacel DSG C/D/I  Pulses: 2+ DP  Sensation: SILT grossly SPN/DPN/Saph/Angelina/Tib  Motor: 5/5 TA/GS/EHL, Quad/Psoas firing limited 2/2 pain    Post-op X-Ray: hardware intact w/o fracture    A/P: 74yFemale s/p RIGHT Posterior POWER by Dr. JOE Pires on 08-02  - Stable  - Pain Control  - DVT ppx: ASA 81 BID  - Post op abx: Ancef, Cefpodoxime 200 BID x 7 days postop for high risk POWER prophylaxis  - WBS: WBAT, Posterior Hip Precautions  - JOSÉ MIGUEL  - K 2.9 this AM; EKG WNL and electrolytes repleted; f/u repeat BMP

## 2024-08-03 NOTE — DISCHARGE NOTE PROVIDER - NSDCMRMEDTOKEN_GEN_ALL_CORE_FT
aspirin 81 mg oral capsule: 1 cap(s) orally once a day  atenolol-chlorthalidone 100 mg-25 mg oral tablet: 1 tab(s) orally once a day  ezetimibe 10 mg oral tablet: 1 tab(s) orally once a day  gabapentin 100 mg oral capsule: 1 cap(s) orally prn  metFORMIN 500 mg oral tablet: 1 tab(s) orally once a day  omeprazole 20 mg oral delayed release capsule: 1 cap(s) orally once a day  rosuvastatin 40 mg oral tablet: 1 tab(s) orally once a day  traMADol 50 mg oral tablet: 1 tab(s) orally prn   acetaminophen 500 mg oral tablet: 2 tab(s) orally every 8 hours MDD: 3  aspirin 81 mg oral tablet, chewable: 1 tab(s) orally 2 times a day MDD: 2  atenolol 100 mg oral tablet: 1 tab(s) orally once a day  atenolol-chlorthalidone 100 mg-25 mg oral tablet: 1 tab(s) orally once a day  cefpodoxime 200 mg oral tablet: 1 tab(s) orally every 12 hours MDD: 2  celecoxib 200 mg oral capsule: 1 cap(s) orally every 12 hours MDD: 2  ezetimibe 10 mg oral tablet: 1 tab(s) orally once a day  metFORMIN 500 mg oral tablet: 1 tab(s) orally once a day  Multiple Vitamins oral tablet: 1 tab(s) orally once a day  oxyCODONE 5 mg oral tablet: 1 tab(s) orally every 6 hours as needed for Moderate Pain (4 - 6) MDD: 4  pantoprazole 40 mg oral delayed release tablet: 1 tab(s) orally once a day (before a meal) MDD: 1  rosuvastatin 40 mg oral tablet: 1 tab(s) orally once a day  senna leaf extract oral tablet: 2 tab(s) orally once a day (at bedtime)

## 2024-08-04 LAB
ANION GAP SERPL CALC-SCNC: 7 MMOL/L — SIGNIFICANT CHANGE UP (ref 5–17)
BUN SERPL-MCNC: 17 MG/DL — SIGNIFICANT CHANGE UP (ref 7–23)
CALCIUM SERPL-MCNC: 9 MG/DL — SIGNIFICANT CHANGE UP (ref 8.4–10.5)
CHLORIDE SERPL-SCNC: 106 MMOL/L — SIGNIFICANT CHANGE UP (ref 96–108)
CO2 SERPL-SCNC: 26 MMOL/L — SIGNIFICANT CHANGE UP (ref 22–31)
CREAT SERPL-MCNC: 0.49 MG/DL — LOW (ref 0.5–1.3)
EGFR: 99 ML/MIN/1.73M2 — SIGNIFICANT CHANGE UP
GLUCOSE BLDC GLUCOMTR-MCNC: 121 MG/DL — HIGH (ref 70–99)
GLUCOSE BLDC GLUCOMTR-MCNC: 133 MG/DL — HIGH (ref 70–99)
GLUCOSE BLDC GLUCOMTR-MCNC: 162 MG/DL — HIGH (ref 70–99)
GLUCOSE BLDC GLUCOMTR-MCNC: 170 MG/DL — HIGH (ref 70–99)
GLUCOSE SERPL-MCNC: 131 MG/DL — HIGH (ref 70–99)
HCT VFR BLD CALC: 32.6 % — LOW (ref 34.5–45)
HGB BLD-MCNC: 10.5 G/DL — LOW (ref 11.5–15.5)
MCHC RBC-ENTMCNC: 28.4 PG — SIGNIFICANT CHANGE UP (ref 27–34)
MCHC RBC-ENTMCNC: 32.2 GM/DL — SIGNIFICANT CHANGE UP (ref 32–36)
MCV RBC AUTO: 88.1 FL — SIGNIFICANT CHANGE UP (ref 80–100)
NRBC # BLD: 0 /100 WBCS — SIGNIFICANT CHANGE UP (ref 0–0)
PLATELET # BLD AUTO: 272 K/UL — SIGNIFICANT CHANGE UP (ref 150–400)
POTASSIUM SERPL-MCNC: 4.4 MMOL/L — SIGNIFICANT CHANGE UP (ref 3.5–5.3)
POTASSIUM SERPL-SCNC: 4.4 MMOL/L — SIGNIFICANT CHANGE UP (ref 3.5–5.3)
RBC # BLD: 3.7 M/UL — LOW (ref 3.8–5.2)
RBC # FLD: 13.2 % — SIGNIFICANT CHANGE UP (ref 10.3–14.5)
SODIUM SERPL-SCNC: 139 MMOL/L — SIGNIFICANT CHANGE UP (ref 135–145)
WBC # BLD: 16.05 K/UL — HIGH (ref 3.8–10.5)
WBC # FLD AUTO: 16.05 K/UL — HIGH (ref 3.8–10.5)

## 2024-08-04 PROCEDURE — 99233 SBSQ HOSP IP/OBS HIGH 50: CPT

## 2024-08-04 RX ADMIN — Medication 1 TABLET(S): at 13:25

## 2024-08-04 RX ADMIN — Medication 200 MILLIGRAM(S): at 17:22

## 2024-08-04 RX ADMIN — Medication 2: at 12:15

## 2024-08-04 RX ADMIN — CELECOXIB 200 MILLIGRAM(S): 200 CAPSULE ORAL at 06:02

## 2024-08-04 RX ADMIN — Medication 1000 MILLIGRAM(S): at 21:22

## 2024-08-04 RX ADMIN — Medication 1000 MILLIGRAM(S): at 06:02

## 2024-08-04 RX ADMIN — OXYCODONE HYDROCHLORIDE 10 MILLIGRAM(S): 30 TABLET ORAL at 09:43

## 2024-08-04 RX ADMIN — PANTOPRAZOLE SODIUM 40 MILLIGRAM(S): 20 TABLET, DELAYED RELEASE ORAL at 05:02

## 2024-08-04 RX ADMIN — SENNOSIDES 2 TABLET(S): 8.6 TABLET ORAL at 21:22

## 2024-08-04 RX ADMIN — CELECOXIB 200 MILLIGRAM(S): 200 CAPSULE ORAL at 05:02

## 2024-08-04 RX ADMIN — Medication 200 MILLIGRAM(S): at 05:02

## 2024-08-04 RX ADMIN — OXYCODONE HYDROCHLORIDE 5 MILLIGRAM(S): 30 TABLET ORAL at 22:22

## 2024-08-04 RX ADMIN — CELECOXIB 200 MILLIGRAM(S): 200 CAPSULE ORAL at 17:23

## 2024-08-04 RX ADMIN — OXYCODONE HYDROCHLORIDE 5 MILLIGRAM(S): 30 TABLET ORAL at 21:22

## 2024-08-04 RX ADMIN — Medication 1000 MILLIGRAM(S): at 13:25

## 2024-08-04 RX ADMIN — Medication 81 MILLIGRAM(S): at 05:02

## 2024-08-04 RX ADMIN — Medication 2: at 22:04

## 2024-08-04 RX ADMIN — Medication 81 MILLIGRAM(S): at 17:22

## 2024-08-04 RX ADMIN — Medication 1000 MILLIGRAM(S): at 05:02

## 2024-08-04 RX ADMIN — OXYCODONE HYDROCHLORIDE 10 MILLIGRAM(S): 30 TABLET ORAL at 10:17

## 2024-08-04 NOTE — PROGRESS NOTE ADULT - SUBJECTIVE AND OBJECTIVE BOX
Ortho Progress Note    Procedure: RIGHT Posterior POWER   Surgeon: Dr. JOE Pires    Pt comfortable without complaints, pain controlled. Rec'd for JOSÉ MIGUEL however wants to go home. Split plan pending  Denies CP, SOB, N/V, numbness/tingling     Vital Signs Last 24 Hrs  T(C): 36.9 (04 Aug 2024 08:26), Max: 36.9 (04 Aug 2024 08:26)  T(F): 98.5 (04 Aug 2024 08:26), Max: 98.5 (04 Aug 2024 08:26)  HR: 72 (04 Aug 2024 08:26) (72 - 81)  BP: 115/66 (04 Aug 2024 08:26) (103/60 - 120/75)  BP(mean): --  RR: 16 (04 Aug 2024 08:26) (16 - 18)  SpO2: 98% (04 Aug 2024 08:26) (95% - 99%)    Parameters below as of 04 Aug 2024 08:26  Patient On (Oxygen Delivery Method): room air    General: Pt Alert and oriented, NAD  Aquacel DSG C/D/I  Pulses: 2+ DP  Sensation: SILT grossly SPN/DPN/Saph/Angelina/Tib  Motor: 5/5 TA/GS/EHL, Quad/Psoas firing limited 2/2 pain    Post-op X-Ray: hardware intact w/o fracture    A/P: 74yFemale s/p RIGHT Posterior POWER by Dr. JOE Pires on 08-02  - Stable  - Pain Control  - DVT ppx: ASA 81 BID  - Post op abx: Ancef, Cefpodoxime 200 BID x 7 days postop for high risk POWER prophylaxis  - WBS: WBAT, Posterior Hip Precautions  - JOSÉ MIGUEL vs home pending progression  - K 2.9 on 8/3; EKG WNL, now normalized

## 2024-08-04 NOTE — PROGRESS NOTE ADULT - SUBJECTIVE AND OBJECTIVE BOX
Patient is a 74y old  Female who presents with a chief complaint of right hip pain (04 Aug 2024 09:25)    INTERVAL EVENTS:      SUBJECTIVE:      MEDICATIONS:  MEDICATIONS  (STANDING):  acetaminophen     Tablet .. 1000 milliGRAM(s) Oral every 8 hours  aspirin  chewable 81 milliGRAM(s) Oral two times a day  atenolol  Tablet 100 milliGRAM(s) Oral daily  atorvastatin 80 milliGRAM(s) Oral at bedtime  cefpodoxime 200 milliGRAM(s) Oral every 12 hours  celecoxib 200 milliGRAM(s) Oral every 12 hours  dextrose 5%. 1000 milliLiter(s) (50 mL/Hr) IV Continuous <Continuous>  dextrose 5%. 1000 milliLiter(s) (100 mL/Hr) IV Continuous <Continuous>  dextrose 50% Injectable 25 Gram(s) IV Push once  dextrose 50% Injectable 12.5 Gram(s) IV Push once  dextrose 50% Injectable 25 Gram(s) IV Push once  gabapentin 300 milliGRAM(s) Oral at bedtime  glucagon  Injectable 1 milliGRAM(s) IntraMuscular once  insulin lispro (ADMELOG) corrective regimen sliding scale   SubCutaneous Before meals and at bedtime  lactated ringers. 1000 milliLiter(s) (75 mL/Hr) IV Continuous <Continuous>  multivitamin 1 Tablet(s) Oral daily  pantoprazole    Tablet 40 milliGRAM(s) Oral before breakfast  senna 2 Tablet(s) Oral at bedtime    MEDICATIONS  (PRN):  aluminum hydroxide/magnesium hydroxide/simethicone Suspension 30 milliLiter(s) Oral every 4 hours PRN Dyspepsia  dextrose Oral Gel 15 Gram(s) Oral once PRN Blood Glucose LESS THAN 70 milliGRAM(s)/deciliter  famotidine    Tablet 20 milliGRAM(s) Oral daily PRN Acid Reflux  HYDROmorphone  Injectable 0.5 milliGRAM(s) IV Push every 3 hours PRN Breakthrough pain  HYDROmorphone  Injectable 0.5 milliGRAM(s) IV Push every 15 minutes PRN breakthrough pain in the PACU  ondansetron Injectable 4 milliGRAM(s) IV Push every 6 hours PRN Nausea and/or Vomiting  oxyCODONE    IR 10 milliGRAM(s) Oral every 3 hours PRN Severe Pain (7 - 10)  oxyCODONE    IR 5 milliGRAM(s) Oral every 3 hours PRN Moderate Pain (4 - 6)      Allergies    No Known Allergies    Intolerances        OBJECTIVE:  Vital Signs Last 24 Hrs  T(C): 36.9 (04 Aug 2024 08:26), Max: 36.9 (04 Aug 2024 08:26)  T(F): 98.5 (04 Aug 2024 08:26), Max: 98.5 (04 Aug 2024 08:26)  HR: 72 (04 Aug 2024 08:26) (72 - 81)  BP: 115/66 (04 Aug 2024 08:26) (103/60 - 120/75)  BP(mean): --  RR: 16 (04 Aug 2024 08:26) (16 - 18)  SpO2: 98% (04 Aug 2024 08:26) (95% - 99%)    Parameters below as of 04 Aug 2024 08:26  Patient On (Oxygen Delivery Method): room air      I&O's Summary    03 Aug 2024 07:01  -  04 Aug 2024 07:00  --------------------------------------------------------  IN: 0 mL / OUT: 900 mL / NET: -900 mL        PHYSICAL EXAM:      LABS:                        10.5   16.05 )-----------( 272      ( 04 Aug 2024 05:30 )             32.6     08-04    139  |  106  |  17  ----------------------------<  131<H>  4.4   |  26  |  0.49<L>    Ca    9.0      04 Aug 2024 05:30  Mg     1.4     08-03          CAPILLARY BLOOD GLUCOSE      POCT Blood Glucose.: 133 mg/dL (04 Aug 2024 07:03)  POCT Blood Glucose.: 178 mg/dL (03 Aug 2024 21:40)  POCT Blood Glucose.: 211 mg/dL (03 Aug 2024 17:52)  POCT Blood Glucose.: 225 mg/dL (03 Aug 2024 11:30)    Urinalysis Basic - ( 04 Aug 2024 05:30 )    Color: x / Appearance: x / SG: x / pH: x  Gluc: 131 mg/dL / Ketone: x  / Bili: x / Urobili: x   Blood: x / Protein: x / Nitrite: x   Leuk Esterase: x / RBC: x / WBC x   Sq Epi: x / Non Sq Epi: x / Bacteria: x        MICRODATA:      RADIOLOGY/OTHER STUDIES:   Patient is a 74y old  Female who presents with a chief complaint of right hip pain (04 Aug 2024 09:25)    INTERVAL EVENTS:  no acute events    SUBJECTIVE:  overall feeling well. trying to do her exercises in bed, reports that her sock interferes. right leg feels heavier than left. tolerating her meals now without significant gerd symptoms.   Rest of 12 point ROS negative, except where noted above.     MEDICATIONS:  MEDICATIONS  (STANDING):  acetaminophen     Tablet .. 1000 milliGRAM(s) Oral every 8 hours  aspirin  chewable 81 milliGRAM(s) Oral two times a day  atenolol  Tablet 100 milliGRAM(s) Oral daily  atorvastatin 80 milliGRAM(s) Oral at bedtime  cefpodoxime 200 milliGRAM(s) Oral every 12 hours  celecoxib 200 milliGRAM(s) Oral every 12 hours  dextrose 5%. 1000 milliLiter(s) (50 mL/Hr) IV Continuous <Continuous>  dextrose 5%. 1000 milliLiter(s) (100 mL/Hr) IV Continuous <Continuous>  dextrose 50% Injectable 25 Gram(s) IV Push once  dextrose 50% Injectable 12.5 Gram(s) IV Push once  dextrose 50% Injectable 25 Gram(s) IV Push once  gabapentin 300 milliGRAM(s) Oral at bedtime  glucagon  Injectable 1 milliGRAM(s) IntraMuscular once  insulin lispro (ADMELOG) corrective regimen sliding scale   SubCutaneous Before meals and at bedtime  lactated ringers. 1000 milliLiter(s) (75 mL/Hr) IV Continuous <Continuous>  multivitamin 1 Tablet(s) Oral daily  pantoprazole    Tablet 40 milliGRAM(s) Oral before breakfast  senna 2 Tablet(s) Oral at bedtime    MEDICATIONS  (PRN):  aluminum hydroxide/magnesium hydroxide/simethicone Suspension 30 milliLiter(s) Oral every 4 hours PRN Dyspepsia  dextrose Oral Gel 15 Gram(s) Oral once PRN Blood Glucose LESS THAN 70 milliGRAM(s)/deciliter  famotidine    Tablet 20 milliGRAM(s) Oral daily PRN Acid Reflux  HYDROmorphone  Injectable 0.5 milliGRAM(s) IV Push every 3 hours PRN Breakthrough pain  HYDROmorphone  Injectable 0.5 milliGRAM(s) IV Push every 15 minutes PRN breakthrough pain in the PACU  ondansetron Injectable 4 milliGRAM(s) IV Push every 6 hours PRN Nausea and/or Vomiting  oxyCODONE    IR 10 milliGRAM(s) Oral every 3 hours PRN Severe Pain (7 - 10)  oxyCODONE    IR 5 milliGRAM(s) Oral every 3 hours PRN Moderate Pain (4 - 6)      Allergies    No Known Allergies    Intolerances        OBJECTIVE:  Vital Signs Last 24 Hrs  T(C): 36.9 (04 Aug 2024 08:26), Max: 36.9 (04 Aug 2024 08:26)  T(F): 98.5 (04 Aug 2024 08:26), Max: 98.5 (04 Aug 2024 08:26)  HR: 72 (04 Aug 2024 08:26) (72 - 81)  BP: 115/66 (04 Aug 2024 08:26) (103/60 - 120/75)  BP(mean): --  RR: 16 (04 Aug 2024 08:26) (16 - 18)  SpO2: 98% (04 Aug 2024 08:26) (95% - 99%)    Parameters below as of 04 Aug 2024 08:26  Patient On (Oxygen Delivery Method): room air      I&O's Summary    03 Aug 2024 07:01  -  04 Aug 2024 07:00  --------------------------------------------------------  IN: 0 mL / OUT: 900 mL / NET: -900 mL        PHYSICAL EXAM:  General: NAD, overweight, resting in bed  HEENT: NC/AT; PERRL, clear conjunctiva  Neck: supple  Respiratory: CTA b/l, incentive spirometer at bedside  Cardiovascular: +S1/S2; RRR  Abdomen: soft, NT/ND; +BS x4  Extremities: 2+ peripheral pulses b/l; strenght is 4/5 in left and 3+/5 in right lower extremity  Skin: normal color and turgor; no rash  Neurological: AAO x 3. no FND.  Psychiatry: appropriate mood/affect     LABS:                        10.5   16.05 )-----------( 272      ( 04 Aug 2024 05:30 )             32.6     08-04    139  |  106  |  17  ----------------------------<  131<H>  4.4   |  26  |  0.49<L>    Ca    9.0      04 Aug 2024 05:30  Mg     1.4     08-03          CAPILLARY BLOOD GLUCOSE      POCT Blood Glucose.: 133 mg/dL (04 Aug 2024 07:03)  POCT Blood Glucose.: 178 mg/dL (03 Aug 2024 21:40)  POCT Blood Glucose.: 211 mg/dL (03 Aug 2024 17:52)  POCT Blood Glucose.: 225 mg/dL (03 Aug 2024 11:30)    Urinalysis Basic - ( 04 Aug 2024 05:30 )    Color: x / Appearance: x / SG: x / pH: x  Gluc: 131 mg/dL / Ketone: x  / Bili: x / Urobili: x   Blood: x / Protein: x / Nitrite: x   Leuk Esterase: x / RBC: x / WBC x   Sq Epi: x / Non Sq Epi: x / Bacteria: x        MICRODATA:      RADIOLOGY/OTHER STUDIES:

## 2024-08-05 ENCOUNTER — TRANSCRIPTION ENCOUNTER (OUTPATIENT)
Age: 74
End: 2024-08-05

## 2024-08-05 LAB
ANION GAP SERPL CALC-SCNC: 7 MMOL/L — SIGNIFICANT CHANGE UP (ref 5–17)
BUN SERPL-MCNC: 20 MG/DL — SIGNIFICANT CHANGE UP (ref 7–23)
CALCIUM SERPL-MCNC: 8.6 MG/DL — SIGNIFICANT CHANGE UP (ref 8.4–10.5)
CHLORIDE SERPL-SCNC: 106 MMOL/L — SIGNIFICANT CHANGE UP (ref 96–108)
CO2 SERPL-SCNC: 27 MMOL/L — SIGNIFICANT CHANGE UP (ref 22–31)
CREAT SERPL-MCNC: 0.5 MG/DL — SIGNIFICANT CHANGE UP (ref 0.5–1.3)
EGFR: 98 ML/MIN/1.73M2 — SIGNIFICANT CHANGE UP
GLUCOSE BLDC GLUCOMTR-MCNC: 117 MG/DL — HIGH (ref 70–99)
GLUCOSE BLDC GLUCOMTR-MCNC: 118 MG/DL — HIGH (ref 70–99)
GLUCOSE BLDC GLUCOMTR-MCNC: 151 MG/DL — HIGH (ref 70–99)
GLUCOSE BLDC GLUCOMTR-MCNC: 172 MG/DL — HIGH (ref 70–99)
GLUCOSE SERPL-MCNC: 110 MG/DL — HIGH (ref 70–99)
HCT VFR BLD CALC: 31.8 % — LOW (ref 34.5–45)
HGB BLD-MCNC: 10.1 G/DL — LOW (ref 11.5–15.5)
MCHC RBC-ENTMCNC: 29.9 PG — SIGNIFICANT CHANGE UP (ref 27–34)
MCHC RBC-ENTMCNC: 31.8 GM/DL — LOW (ref 32–36)
MCV RBC AUTO: 94.1 FL — SIGNIFICANT CHANGE UP (ref 80–100)
NRBC # BLD: 0 /100 WBCS — SIGNIFICANT CHANGE UP (ref 0–0)
PLATELET # BLD AUTO: 261 K/UL — SIGNIFICANT CHANGE UP (ref 150–400)
POTASSIUM SERPL-MCNC: 4.2 MMOL/L — SIGNIFICANT CHANGE UP (ref 3.5–5.3)
POTASSIUM SERPL-SCNC: 4.2 MMOL/L — SIGNIFICANT CHANGE UP (ref 3.5–5.3)
RBC # BLD: 3.38 M/UL — LOW (ref 3.8–5.2)
RBC # FLD: 13.7 % — SIGNIFICANT CHANGE UP (ref 10.3–14.5)
SODIUM SERPL-SCNC: 140 MMOL/L — SIGNIFICANT CHANGE UP (ref 135–145)
WBC # BLD: 12.63 K/UL — HIGH (ref 3.8–10.5)
WBC # FLD AUTO: 12.63 K/UL — HIGH (ref 3.8–10.5)

## 2024-08-05 PROCEDURE — 99233 SBSQ HOSP IP/OBS HIGH 50: CPT

## 2024-08-05 RX ORDER — MELATONIN 3 MG
5 TABLET ORAL AT BEDTIME
Refills: 0 | Status: DISCONTINUED | OUTPATIENT
Start: 2024-08-05 | End: 2024-08-06

## 2024-08-05 RX ADMIN — Medication 5 MILLIGRAM(S): at 02:43

## 2024-08-05 RX ADMIN — CELECOXIB 200 MILLIGRAM(S): 200 CAPSULE ORAL at 17:07

## 2024-08-05 RX ADMIN — Medication 2: at 11:31

## 2024-08-05 RX ADMIN — OXYCODONE HYDROCHLORIDE 5 MILLIGRAM(S): 30 TABLET ORAL at 01:27

## 2024-08-05 RX ADMIN — Medication 1000 MILLIGRAM(S): at 21:26

## 2024-08-05 RX ADMIN — OXYCODONE HYDROCHLORIDE 5 MILLIGRAM(S): 30 TABLET ORAL at 12:40

## 2024-08-05 RX ADMIN — OXYCODONE HYDROCHLORIDE 10 MILLIGRAM(S): 30 TABLET ORAL at 23:54

## 2024-08-05 RX ADMIN — OXYCODONE HYDROCHLORIDE 10 MILLIGRAM(S): 30 TABLET ORAL at 20:47

## 2024-08-05 RX ADMIN — Medication 2: at 21:56

## 2024-08-05 RX ADMIN — Medication 5 MILLIGRAM(S): at 21:26

## 2024-08-05 RX ADMIN — Medication 200 MILLIGRAM(S): at 06:32

## 2024-08-05 RX ADMIN — OXYCODONE HYDROCHLORIDE 5 MILLIGRAM(S): 30 TABLET ORAL at 02:27

## 2024-08-05 RX ADMIN — CELECOXIB 200 MILLIGRAM(S): 200 CAPSULE ORAL at 06:51

## 2024-08-05 RX ADMIN — Medication 1000 MILLIGRAM(S): at 14:38

## 2024-08-05 RX ADMIN — PANTOPRAZOLE SODIUM 40 MILLIGRAM(S): 20 TABLET, DELAYED RELEASE ORAL at 06:52

## 2024-08-05 RX ADMIN — Medication 1000 MILLIGRAM(S): at 13:40

## 2024-08-05 RX ADMIN — GABAPENTIN 300 MILLIGRAM(S): 400 CAPSULE ORAL at 21:26

## 2024-08-05 RX ADMIN — OXYCODONE HYDROCHLORIDE 10 MILLIGRAM(S): 30 TABLET ORAL at 21:47

## 2024-08-05 RX ADMIN — OXYCODONE HYDROCHLORIDE 5 MILLIGRAM(S): 30 TABLET ORAL at 12:02

## 2024-08-05 RX ADMIN — Medication 81 MILLIGRAM(S): at 06:33

## 2024-08-05 RX ADMIN — CELECOXIB 200 MILLIGRAM(S): 200 CAPSULE ORAL at 17:39

## 2024-08-05 RX ADMIN — ATENOLOL 100 MILLIGRAM(S): 100 TABLET ORAL at 06:32

## 2024-08-05 RX ADMIN — Medication 200 MILLIGRAM(S): at 17:07

## 2024-08-05 RX ADMIN — Medication 1000 MILLIGRAM(S): at 06:52

## 2024-08-05 RX ADMIN — SENNOSIDES 2 TABLET(S): 8.6 TABLET ORAL at 21:26

## 2024-08-05 RX ADMIN — Medication 81 MILLIGRAM(S): at 17:07

## 2024-08-05 RX ADMIN — Medication 1 TABLET(S): at 11:13

## 2024-08-05 NOTE — PROGRESS NOTE ADULT - SUBJECTIVE AND OBJECTIVE BOX
Ortho Progress Note    Procedure: RIGHT Posterior POWER   Surgeon: Dr. JOE Pires    Pt comfortable without complaints, pain controlled. Split plan pending  Denies CP, SOB, N/V, numbness/tingling     Vital Signs Last 24 Hrs  T(C): 36.5 (05 Aug 2024 05:02), Max: 36.9 (04 Aug 2024 08:26)  T(F): 97.7 (05 Aug 2024 05:02), Max: 98.5 (04 Aug 2024 08:26)  HR: 73 (05 Aug 2024 05:02) (63 - 73)  BP: 114/68 (05 Aug 2024 05:02) (104/64 - 126/70)  BP(mean): --  RR: 16 (05 Aug 2024 05:02) (16 - 18)  SpO2: 97% (05 Aug 2024 05:02) (97% - 98%)    Parameters below as of 05 Aug 2024 05:02  Patient On (Oxygen Delivery Method): room air      General: Pt Alert and oriented, NAD  Aquacel DSG C/D/I  Pulses: 2+ DP  Sensation: SILT grossly SPN/DPN/Saph/Angelina/Tib  Motor: 5/5 TA/GS/EHL, Quad/Psoas firing limited 2/2 pain    Post-op X-Ray: hardware intact w/o fracture    A/P: 74yFemale s/p RIGHT Posterior POWER by Dr. JOE Pires on 08-02  - Stable  - Pain Control  - DVT ppx: ASA 81 BID  - Post op abx: Ancef, Cefpodoxime 200 BID x 7 days postop for high risk POWER prophylaxis  - WBS: WBAT, Posterior Hip Precautions  - [facility] vs home pending progression  - K 2.9 on 8/3; EKG WNL, now normalized

## 2024-08-05 NOTE — PROGRESS NOTE ADULT - SUBJECTIVE AND OBJECTIVE BOX
Didn't sleep very well last night. Pain is controlled, but sometimes the medication makes her dizzy.  Still has some reflux symptoms since the surgery.        Remaining ROS negative       PHYSICAL EXAM:    General: nad, sitting up in bed  HEENT: NC/AT; MMM  Cardiovascular RRR, normal s1s2, no mrg  Respiratory: CTA B/L; no W/R/R  Gastrointestinal: soft, NT/ND; +BSx4  Extremities: WWP; no edema  Neurological: speech is fluent, no facial asymmetry, follows commands, no acute deficits noted  Psychiatric: pleasant mood and affect  Dermatologic: no appreciable wounds or damage to the skin    VITAL SIGNS:  Vital Signs Last 24 Hrs  T(C): 36.6 (05 Aug 2024 09:03), Max: 36.6 (04 Aug 2024 16:40)  T(F): 97.8 (05 Aug 2024 09:03), Max: 97.8 (04 Aug 2024 16:40)  HR: 64 (05 Aug 2024 09:03) (63 - 73)  BP: 112/71 (05 Aug 2024 09:03) (104/64 - 126/70)  BP(mean): --  RR: 18 (05 Aug 2024 09:03) (16 - 18)  SpO2: 97% (05 Aug 2024 09:03) (97% - 97%)    Parameters below as of 05 Aug 2024 09:03  Patient On (Oxygen Delivery Method): room air          MEDICATIONS:  MEDICATIONS  (STANDING):  acetaminophen     Tablet .. 1000 milliGRAM(s) Oral every 8 hours  aspirin  chewable 81 milliGRAM(s) Oral two times a day  atenolol  Tablet 100 milliGRAM(s) Oral daily  atorvastatin 80 milliGRAM(s) Oral at bedtime  cefpodoxime 200 milliGRAM(s) Oral every 12 hours  celecoxib 200 milliGRAM(s) Oral every 12 hours  dextrose 5%. 1000 milliLiter(s) (100 mL/Hr) IV Continuous <Continuous>  dextrose 5%. 1000 milliLiter(s) (50 mL/Hr) IV Continuous <Continuous>  dextrose 50% Injectable 12.5 Gram(s) IV Push once  dextrose 50% Injectable 25 Gram(s) IV Push once  dextrose 50% Injectable 25 Gram(s) IV Push once  gabapentin 300 milliGRAM(s) Oral at bedtime  glucagon  Injectable 1 milliGRAM(s) IntraMuscular once  insulin lispro (ADMELOG) corrective regimen sliding scale   SubCutaneous Before meals and at bedtime  lactated ringers. 1000 milliLiter(s) (75 mL/Hr) IV Continuous <Continuous>  melatonin 5 milliGRAM(s) Oral at bedtime  multivitamin 1 Tablet(s) Oral daily  pantoprazole    Tablet 40 milliGRAM(s) Oral before breakfast  senna 2 Tablet(s) Oral at bedtime    MEDICATIONS  (PRN):  aluminum hydroxide/magnesium hydroxide/simethicone Suspension 30 milliLiter(s) Oral every 4 hours PRN Dyspepsia  dextrose Oral Gel 15 Gram(s) Oral once PRN Blood Glucose LESS THAN 70 milliGRAM(s)/deciliter  famotidine    Tablet 20 milliGRAM(s) Oral daily PRN Acid Reflux  HYDROmorphone  Injectable 0.5 milliGRAM(s) IV Push every 3 hours PRN Breakthrough pain  HYDROmorphone  Injectable 0.5 milliGRAM(s) IV Push every 15 minutes PRN breakthrough pain in the PACU  ondansetron Injectable 4 milliGRAM(s) IV Push every 6 hours PRN Nausea and/or Vomiting  oxyCODONE    IR 5 milliGRAM(s) Oral every 3 hours PRN Moderate Pain (4 - 6)  oxyCODONE    IR 10 milliGRAM(s) Oral every 3 hours PRN Severe Pain (7 - 10)      ALLERGIES:  Allergies    No Known Allergies    Intolerances        LABS:                        10.1   12.63 )-----------( 261      ( 05 Aug 2024 05:30 )             31.8     08-05    140  |  106  |  20  ----------------------------<  110<H>  4.2   |  27  |  0.50    Ca    8.6      05 Aug 2024 05:30        Urinalysis Basic - ( 05 Aug 2024 05:30 )    Color: x / Appearance: x / SG: x / pH: x  Gluc: 110 mg/dL / Ketone: x  / Bili: x / Urobili: x   Blood: x / Protein: x / Nitrite: x   Leuk Esterase: x / RBC: x / WBC x   Sq Epi: x / Non Sq Epi: x / Bacteria: x      CAPILLARY BLOOD GLUCOSE      POCT Blood Glucose.: 151 mg/dL (05 Aug 2024 11:23)      RADIOLOGY & ADDITIONAL TESTS: Reviewed.

## 2024-08-05 NOTE — PROGRESS NOTE ADULT - SUBJECTIVE AND OBJECTIVE BOX
Ortho Note    Subjective:  Pt comfortable without complaints, pain controlled with current pain medication regimen  Denies CP, SOB, N/V, numbness/tingling   Reviewed plan of care with patient at bedside  patient requesting melatonin po for  sleep     Vital Signs Last 24 Hrs  T(C): 36.6 (08-05-24 @ 09:03), Max: 36.6 (08-05-24 @ 09:03)  T(F): 97.8 (08-05-24 @ 09:03), Max: 97.8 (08-05-24 @ 09:03)  HR: 64 (08-05-24 @ 09:03) (64 - 64)  BP: 112/71 (08-05-24 @ 09:03) (112/71 - 112/71)  BP(mean): --  RR: 18 (08-05-24 @ 09:03) (18 - 18)  SpO2: 97% (08-05-24 @ 09:03) (97% - 97%)  AVSS    Objective:    Physical Exam:  General: Pt Alert and oriented, NAD  Right HIP and Right flank aquacel DSG C/D/I  Pulses: +2 pedal pules DP, wwp toes  Sensation: silt intact bilateral lower extremities  Motor: EHL/FHL/TA/GS- 5/5 bilateral lower extremities        Plan of Care:  A/P: 74yFemale s/p RIGHT Posterior POWER by Dr. JOE Pires on 08-02  - afebrile, wbcs 12.63  - Pain Control- celebrex 200mg PO Q12h, tylenol 1000mg PO Q8h, gabapentin 300mg po at bedtime , Oxycodone 5mg-10mg PO Q4h prn moderate to severe pain    - DVT ppx: aspirin 81mg PO BID  - PT, WBS: WBAT  - appreciate medicine recs  - bowel regimen, IS use, PPI   - Dispo- Split plan home versus [facility]     Ortho Pager [phone]

## 2024-08-05 NOTE — DISCHARGE NOTE NURSING/CASE MANAGEMENT/SOCIAL WORK - PATIENT PORTAL LINK FT
You can access the FollowMyHealth Patient Portal offered by [facility] by registering at the following website: [email or web address]. By joining [employer]’s FollowMyHealth portal, you will also be able to view your health information using other applications (apps) compatible with our system.

## 2024-08-05 NOTE — DISCHARGE NOTE NURSING/CASE MANAGEMENT/SOCIAL WORK - NSDCVIVACCINE_GEN_ALL_CORE_FT
Tdap; 17-Jul-2016 18:05; Ronak Espinoza (SUSI); [employer]; [id number]; IntraMuscular; Deltoid Left.; 0.5 milliLiter(s); VIS (VIS Published: 09-May-2013, VIS Presented: 17-Jul-2016);

## 2024-08-06 VITALS
DIASTOLIC BLOOD PRESSURE: 65 MMHG | TEMPERATURE: 98 F | RESPIRATION RATE: 17 BRPM | OXYGEN SATURATION: 98 % | SYSTOLIC BLOOD PRESSURE: 108 MMHG | HEART RATE: 60 BPM

## 2024-08-06 LAB
ANION GAP SERPL CALC-SCNC: 7 MMOL/L — SIGNIFICANT CHANGE UP (ref 5–17)
BUN SERPL-MCNC: 14 MG/DL — SIGNIFICANT CHANGE UP (ref 7–23)
CALCIUM SERPL-MCNC: 9.1 MG/DL — SIGNIFICANT CHANGE UP (ref 8.4–10.5)
CHLORIDE SERPL-SCNC: 105 MMOL/L — SIGNIFICANT CHANGE UP (ref 96–108)
CO2 SERPL-SCNC: 27 MMOL/L — SIGNIFICANT CHANGE UP (ref 22–31)
CREAT SERPL-MCNC: 0.45 MG/DL — LOW (ref 0.5–1.3)
EGFR: 101 ML/MIN/1.73M2 — SIGNIFICANT CHANGE UP
GLUCOSE BLDC GLUCOMTR-MCNC: 119 MG/DL — HIGH (ref 70–99)
GLUCOSE BLDC GLUCOMTR-MCNC: 152 MG/DL — HIGH (ref 70–99)
GLUCOSE SERPL-MCNC: 128 MG/DL — HIGH (ref 70–99)
HCT VFR BLD CALC: 34 % — LOW (ref 34.5–45)
HGB BLD-MCNC: 10.9 G/DL — LOW (ref 11.5–15.5)
MCHC RBC-ENTMCNC: 29.6 PG — SIGNIFICANT CHANGE UP (ref 27–34)
MCHC RBC-ENTMCNC: 32.1 GM/DL — SIGNIFICANT CHANGE UP (ref 32–36)
MCV RBC AUTO: 92.4 FL — SIGNIFICANT CHANGE UP (ref 80–100)
NRBC # BLD: 0 /100 WBCS — SIGNIFICANT CHANGE UP (ref 0–0)
PLATELET # BLD AUTO: 306 K/UL — SIGNIFICANT CHANGE UP (ref 150–400)
POTASSIUM SERPL-MCNC: 4.3 MMOL/L — SIGNIFICANT CHANGE UP (ref 3.5–5.3)
POTASSIUM SERPL-SCNC: 4.3 MMOL/L — SIGNIFICANT CHANGE UP (ref 3.5–5.3)
RBC # BLD: 3.68 M/UL — LOW (ref 3.8–5.2)
RBC # FLD: 13.9 % — SIGNIFICANT CHANGE UP (ref 10.3–14.5)
SODIUM SERPL-SCNC: 139 MMOL/L — SIGNIFICANT CHANGE UP (ref 135–145)
WBC # BLD: 11.01 K/UL — HIGH (ref 3.8–10.5)
WBC # FLD AUTO: 11.01 K/UL — HIGH (ref 3.8–10.5)

## 2024-08-06 PROCEDURE — 86850 RBC ANTIBODY SCREEN: CPT

## 2024-08-06 PROCEDURE — 85027 COMPLETE CBC AUTOMATED: CPT

## 2024-08-06 PROCEDURE — 86901 BLOOD TYPING SEROLOGIC RH(D): CPT

## 2024-08-06 PROCEDURE — 36415 COLL VENOUS BLD VENIPUNCTURE: CPT

## 2024-08-06 PROCEDURE — 83036 HEMOGLOBIN GLYCOSYLATED A1C: CPT

## 2024-08-06 PROCEDURE — 82962 GLUCOSE BLOOD TEST: CPT

## 2024-08-06 PROCEDURE — 86900 BLOOD TYPING SEROLOGIC ABO: CPT

## 2024-08-06 PROCEDURE — 97530 THERAPEUTIC ACTIVITIES: CPT

## 2024-08-06 PROCEDURE — 83735 ASSAY OF MAGNESIUM: CPT

## 2024-08-06 PROCEDURE — 72170 X-RAY EXAM OF PELVIS: CPT

## 2024-08-06 PROCEDURE — C1889: CPT

## 2024-08-06 PROCEDURE — S2900: CPT

## 2024-08-06 PROCEDURE — 97116 GAIT TRAINING THERAPY: CPT

## 2024-08-06 PROCEDURE — C1713: CPT

## 2024-08-06 PROCEDURE — 99233 SBSQ HOSP IP/OBS HIGH 50: CPT

## 2024-08-06 PROCEDURE — 80048 BASIC METABOLIC PNL TOTAL CA: CPT

## 2024-08-06 PROCEDURE — 97165 OT EVAL LOW COMPLEX 30 MIN: CPT

## 2024-08-06 PROCEDURE — 97161 PT EVAL LOW COMPLEX 20 MIN: CPT

## 2024-08-06 PROCEDURE — C1776: CPT

## 2024-08-06 RX ORDER — CEFPODOXIME PROXETIL 100 MG
1 TABLET ORAL
Qty: 14 | Refills: 0
Start: 2024-08-06 | End: 2024-08-12

## 2024-08-06 RX ORDER — ATENOLOL 100 MG/1
1 TABLET ORAL
Qty: 0 | Refills: 0 | DISCHARGE
Start: 2024-08-06

## 2024-08-06 RX ORDER — ASPIRIN 500 MG
1 TABLET ORAL
Qty: 60 | Refills: 0
Start: 2024-08-06 | End: 2024-09-04

## 2024-08-06 RX ORDER — SENNOSIDES 8.6 MG/1
2 TABLET ORAL
Qty: 0 | Refills: 0 | DISCHARGE
Start: 2024-08-06

## 2024-08-06 RX ORDER — MAGNESIUM HYDROXIDE 400 MG/5ML
30 SUSPENSION, ORAL (FINAL DOSE FORM) ORAL DAILY
Refills: 0 | Status: DISCONTINUED | OUTPATIENT
Start: 2024-08-06 | End: 2024-08-06

## 2024-08-06 RX ORDER — CELECOXIB 200 MG/1
1 CAPSULE ORAL
Qty: 14 | Refills: 0
Start: 2024-08-06 | End: 2024-08-12

## 2024-08-06 RX ORDER — GABAPENTIN 400 MG/1
1 CAPSULE ORAL
Refills: 0 | DISCHARGE

## 2024-08-06 RX ORDER — CYANOCOBALAMIN/FOLIC AC/VIT B6 2-2.5-25MG
1 TABLET ORAL
Qty: 0 | Refills: 0 | DISCHARGE
Start: 2024-08-06

## 2024-08-06 RX ORDER — ACETAMINOPHEN 500 MG
2 TABLET ORAL
Qty: 42 | Refills: 0
Start: 2024-08-06 | End: 2024-08-12

## 2024-08-06 RX ORDER — PANTOPRAZOLE SODIUM 20 MG/1
1 TABLET, DELAYED RELEASE ORAL
Qty: 30 | Refills: 0
Start: 2024-08-06 | End: 2024-09-04

## 2024-08-06 RX ORDER — ASPIRIN 500 MG
1 TABLET ORAL
Refills: 0 | DISCHARGE

## 2024-08-06 RX ORDER — ASPIRIN 325 MG
10 TABLET ORAL DAILY
Refills: 0 | Status: DISCONTINUED | OUTPATIENT
Start: 2024-08-06 | End: 2024-08-06

## 2024-08-06 RX ORDER — TRAMADOL HCL 50 MG
1 TABLET ORAL
Refills: 0 | DISCHARGE

## 2024-08-06 RX ORDER — OMEPRAZOLE 100 %
1 POWDER (GRAM) MISCELLANEOUS
Refills: 0 | DISCHARGE

## 2024-08-06 RX ORDER — OXYCODONE HYDROCHLORIDE 30 MG/1
1 TABLET ORAL
Qty: 28 | Refills: 0
Start: 2024-08-06 | End: 2024-08-12

## 2024-08-06 RX ADMIN — Medication 200 MILLIGRAM(S): at 05:49

## 2024-08-06 RX ADMIN — Medication 1000 MILLIGRAM(S): at 05:52

## 2024-08-06 RX ADMIN — PANTOPRAZOLE SODIUM 40 MILLIGRAM(S): 20 TABLET, DELAYED RELEASE ORAL at 05:52

## 2024-08-06 RX ADMIN — Medication 1000 MILLIGRAM(S): at 14:08

## 2024-08-06 RX ADMIN — Medication 2: at 11:45

## 2024-08-06 RX ADMIN — Medication 1 TABLET(S): at 11:43

## 2024-08-06 RX ADMIN — Medication 81 MILLIGRAM(S): at 05:49

## 2024-08-06 RX ADMIN — Medication 1000 MILLIGRAM(S): at 15:00

## 2024-08-06 RX ADMIN — OXYCODONE HYDROCHLORIDE 10 MILLIGRAM(S): 30 TABLET ORAL at 00:54

## 2024-08-06 RX ADMIN — CELECOXIB 200 MILLIGRAM(S): 200 CAPSULE ORAL at 05:50

## 2024-08-06 RX ADMIN — ATENOLOL 100 MILLIGRAM(S): 100 TABLET ORAL at 05:49

## 2024-08-06 RX ADMIN — ATORVASTATIN CALCIUM 80 MILLIGRAM(S): 40 TABLET, FILM COATED ORAL at 05:52

## 2024-08-06 RX ADMIN — Medication 10 MILLIGRAM(S): at 12:15

## 2024-08-06 NOTE — PROGRESS NOTE ADULT - SUBJECTIVE AND OBJECTIVE BOX
Feeling a bit better today.  Still no bowel movement, but is passing gas.  The combination of melatonin and pain medication last night made her very drowsy this morning.  She ate this morning, and is able to swallow.  The croissant she ate felt temporarily a little stuck, but then made its way down without any issues.      Remaining ROS negative       PHYSICAL EXAM:    General: nad, sitting up in bed  HEENT: NC/AT; MMM  Cardiovascular RRR, normal s1s2, no mrg  Respiratory: CTA B/L; no W/R/R  Gastrointestinal: soft, NT/ND; +BSx4  Extremities: WWP; no edema  Neurological: speech is fluent, no facial asymmetry, follows commands, no acute deficits noted  Psychiatric: pleasant mood and affect  Dermatologic: no appreciable wounds or damage to the skin    VITAL SIGNS:  Vital Signs Last 24 Hrs  T(C): 36.8 (06 Aug 2024 08:56), Max: 36.8 (06 Aug 2024 08:56)  T(F): 98.2 (06 Aug 2024 08:56), Max: 98.2 (06 Aug 2024 08:56)  HR: 60 (06 Aug 2024 08:56) (60 - 75)  BP: 108/65 (06 Aug 2024 08:56) (108/65 - 129/73)  BP(mean): --  RR: 17 (06 Aug 2024 08:56) (15 - 17)  SpO2: 98% (06 Aug 2024 08:56) (96% - 98%)    Parameters below as of 06 Aug 2024 08:56  Patient On (Oxygen Delivery Method): room air          MEDICATIONS:  MEDICATIONS  (STANDING):  acetaminophen     Tablet .. 1000 milliGRAM(s) Oral every 8 hours  aspirin  chewable 81 milliGRAM(s) Oral two times a day  atenolol  Tablet 100 milliGRAM(s) Oral daily  cefpodoxime 200 milliGRAM(s) Oral every 12 hours  celecoxib 200 milliGRAM(s) Oral every 12 hours  dextrose 5%. 1000 milliLiter(s) (100 mL/Hr) IV Continuous <Continuous>  dextrose 5%. 1000 milliLiter(s) (50 mL/Hr) IV Continuous <Continuous>  dextrose 50% Injectable 12.5 Gram(s) IV Push once  dextrose 50% Injectable 25 Gram(s) IV Push once  dextrose 50% Injectable 25 Gram(s) IV Push once  gabapentin 300 milliGRAM(s) Oral at bedtime  glucagon  Injectable 1 milliGRAM(s) IntraMuscular once  insulin lispro (ADMELOG) corrective regimen sliding scale   SubCutaneous Before meals and at bedtime  lactated ringers. 1000 milliLiter(s) (75 mL/Hr) IV Continuous <Continuous>  melatonin 5 milliGRAM(s) Oral at bedtime  multivitamin 1 Tablet(s) Oral daily  pantoprazole    Tablet 40 milliGRAM(s) Oral before breakfast  senna 2 Tablet(s) Oral at bedtime    MEDICATIONS  (PRN):  aluminum hydroxide/magnesium hydroxide/simethicone Suspension 30 milliLiter(s) Oral every 4 hours PRN Dyspepsia  bisacodyl Suppository 10 milliGRAM(s) Rectal daily PRN Constipation  dextrose Oral Gel 15 Gram(s) Oral once PRN Blood Glucose LESS THAN 70 milliGRAM(s)/deciliter  famotidine    Tablet 20 milliGRAM(s) Oral daily PRN Acid Reflux  HYDROmorphone  Injectable 0.5 milliGRAM(s) IV Push every 3 hours PRN Breakthrough pain  HYDROmorphone  Injectable 0.5 milliGRAM(s) IV Push every 15 minutes PRN breakthrough pain in the PACU  magnesium hydroxide Suspension 30 milliLiter(s) Oral daily PRN Constipation  ondansetron Injectable 4 milliGRAM(s) IV Push every 6 hours PRN Nausea and/or Vomiting  oxyCODONE    IR 10 milliGRAM(s) Oral every 3 hours PRN Severe Pain (7 - 10)  oxyCODONE    IR 5 milliGRAM(s) Oral every 3 hours PRN Moderate Pain (4 - 6)      ALLERGIES:  Allergies    No Known Allergies    Intolerances        LABS:                        10.9   11.01 )-----------( 306      ( 06 Aug 2024 07:42 )             34.0     08-06    139  |  105  |  14  ----------------------------<  128<H>  4.3   |  27  |  0.45<L>    Ca    9.1      06 Aug 2024 07:42        Urinalysis Basic - ( 06 Aug 2024 07:42 )    Color: x / Appearance: x / SG: x / pH: x  Gluc: 128 mg/dL / Ketone: x  / Bili: x / Urobili: x   Blood: x / Protein: x / Nitrite: x   Leuk Esterase: x / RBC: x / WBC x   Sq Epi: x / Non Sq Epi: x / Bacteria: x      CAPILLARY BLOOD GLUCOSE      POCT Blood Glucose.: 152 mg/dL (06 Aug 2024 11:39)      RADIOLOGY & ADDITIONAL TESTS: Reviewed.

## 2024-08-06 NOTE — PROGRESS NOTE ADULT - SUBJECTIVE AND OBJECTIVE BOX
Ortho Note    Subjective:  Pt seen and examined today  Patient requesting a suppository before discharge  Reports feeling drowzy this am, which she attributes to Oxycodone 10mg PO overnight   Denies CP, SOB, N/V, numbness/tingling   Reviewed plan of care with patient and  at bedside    Vital Signs Last 24 Hrs  T(C): 36.8 (08-06-24 @ 08:56), Max: 36.8 (08-06-24 @ 08:56)  T(F): 98.2 (08-06-24 @ 08:56), Max: 98.2 (08-06-24 @ 08:56)  HR: 60 (08-06-24 @ 08:56) (60 - 63)  BP: 108/65 (08-06-24 @ 08:56) (108/65 - 126/73)  BP(mean): --  RR: 17 (08-06-24 @ 08:56) (15 - 17)  SpO2: 98% (08-06-24 @ 08:56) (96% - 98%)  AVSS    Objective:    Physical Exam:  General: Pt Alert and oriented, NAD  Right HIP and Right flank aquacel DSG C/D/I- aquacels changed 8-6-2024  Pulses: +2 pedal pules DP, wwp toes  Sensation: silt intact bilateral lower extremities  Motor: EHL/FHL/TA/GS- 5/5 bilateral lower extremities        Plan of Care:  A/P: 74yFemale s/p RIGHT Posterior POWER by Dr. JOE Pires on 08-02  - afebrile, wbcs 11.01  - Pain Control- celebrex 200mg PO Q12h, tylenol 1000mg PO Q8h, gabapentin 300mg po at bedtime , Oxycodone 5mg-10mg PO Q4h prn moderate to severe pain    - DVT ppx: aspirin 81mg PO BID  - PT, WBS: WBAT  - appreciate medicine recs  - bowel regimen, IS use, PPI - suppository x1  - Dispo- Split plan home versus [facility]     Ortho Pager [phone]

## 2024-08-06 NOTE — PROGRESS NOTE ADULT - PROVIDER SPECIALTY LIST ADULT
Hospitalist
Orthopedics
Hospitalist
Hospitalist
Orthopedics

## 2024-08-06 NOTE — PROGRESS NOTE ADULT - SUBJECTIVE AND OBJECTIVE BOX
Ortho Progress Note    Procedure: RIGHT Posterior POWER   Surgeon: Dr. JOE Pires    Pt comfortable without complaints, pain controlled. DC home today  Denies CP, SOB, N/V, numbness/tingling     Vital Signs Last 24 Hrs  T(C): 36.6 (06 Aug 2024 05:39), Max: 36.6 (05 Aug 2024 09:03)  T(F): 97.8 (06 Aug 2024 05:39), Max: 97.8 (05 Aug 2024 09:03)  HR: 61 (06 Aug 2024 05:39) (61 - 75)  BP: 126/73 (06 Aug 2024 05:39) (111/71 - 129/73)  BP(mean): --  RR: 15 (06 Aug 2024 05:39) (15 - 18)  SpO2: 96% (06 Aug 2024 05:39) (96% - 98%)    Parameters below as of 06 Aug 2024 05:39  Patient On (Oxygen Delivery Method): room air    General: Pt Alert and oriented, NAD  Aquacel DSG C/D/I  Pulses: 2+ DP  Sensation: SILT grossly SPN/DPN/Saph/Angelina/Tib  Motor: 5/5 TA/GS/EHL, Quad/Psoas firing limited 2/2 pain    Post-op X-Ray: hardware intact w/o fracture    A/P: 74yFemale s/p RIGHT Posterior POWER by Dr. JOE Pires on 08-02  - Stable  - Pain Control  - DVT ppx: ASA 81 BID  - Post op abx: Ancef, Cefpodoxime 200 BID x 7 days postop for high risk POWER prophylaxis  - WBS: WBAT, Posterior Hip Precautions  - DC home today

## 2024-08-06 NOTE — PROGRESS NOTE ADULT - REASON FOR ADMISSION
right hip pain

## 2024-08-06 NOTE — PROGRESS NOTE ADULT - ASSESSMENT
74 year old female with history of HLD, DM, HTN, chronic hip pain, now s/p POWER of the right hip on 8/2.    # post-op status  s/p POWER  - management per ortho primary team  - pain control, well controlled  - PT/OT  - incentive spirometer  - oob to chair  - encourage oob 2 chair    # GERD symptoms  #?dysphagia  - The famotidine helps a little bit, and sometimes this happens to her at home with rice and bread  - recommend outpatient follow up with PCP/GI    # DM  - iss inpatient, cc diet  - holding metformin inpatient    # HTN  per history.  - normotensive inpatient  - hold antihypertensive agents for now - can resume on discharge    # HLD  - c/w Rosuvastatin 40    patient requesting suppository before discharge today.     50 minutes spent on this encounter including face to face with patient, care coordination, review of chart and documentation.   Plan discussed with ortho team. 
74 year old female with history of HLD, DM, HTN, chronic hip pain, now s/p POWER of the right hip on 8/2.    # post-op status  s/p POWER  - management per ortho primary team  - pain control, well controlled  - PT/OT  - incentive spirometer  - oob to chair  - encourage oob 2 chair    # GERD symptoms  #?dysphagia  - still having some discomfort, difficulty swallowing solids since surgery.  The famotidine helps a little bit, and sometimes this happens to her at home with rice and bread, but noticing it a little more for the past few days.  Would recommend getting SLP evaluation.     # DM  - iss inpatient, cc diet  - holding metformin inpatient    # HTN  per history.  - normotensive inpatient  - hold antihypertensive agents for now    # HLD  - c/w Rosuvastatin 40    50 minutes spent on this encounter including face to face with patient, care coordination, review of chart and documentation.   Plan discussed with ortho team. 
74 year old female with history of HLD, DM, HTN, chronic hip pain, now s/p POWER of the right hip on 8/2.    # post-op status  s/p POWER  - management per ortho primary team  - pain control, well controlled  - PT/OT  - encourage oob 2 chair  - encourage use of incentive spirometer  - DVT ppx    # GERD symptoms  - improved on Famotidine 20 daily    # DM  - iss inpatient, cc diet  - holding metformin inpatient    # HTN  per history.  - normotensive inpatient  - hold antihypertensive agents for now    # HLD  - c/w Rosuvastatin 40    F: s/p LR  E: Replete electrolytes as needed, K>4, M>2  N: cc diet  DVT ppx: on asa 81 BID, SCDs, encourage ambulation  GI ppx: recommend start famoditine  Dispo: recommended for [facility], patient prefers to go home, will work on split plan    CODE STATUS: Full Code

## 2024-08-14 DIAGNOSIS — E78.5 HYPERLIPIDEMIA, UNSPECIFIED: ICD-10-CM

## 2024-08-14 DIAGNOSIS — E11.9 TYPE 2 DIABETES MELLITUS WITHOUT COMPLICATIONS: ICD-10-CM

## 2024-08-14 DIAGNOSIS — K21.9 GASTRO-ESOPHAGEAL REFLUX DISEASE WITHOUT ESOPHAGITIS: ICD-10-CM

## 2024-08-14 DIAGNOSIS — Z79.84 LONG TERM (CURRENT) USE OF ORAL HYPOGLYCEMIC DRUGS: ICD-10-CM

## 2024-08-14 DIAGNOSIS — R13.10 DYSPHAGIA, UNSPECIFIED: ICD-10-CM

## 2024-08-14 DIAGNOSIS — I10 ESSENTIAL (PRIMARY) HYPERTENSION: ICD-10-CM

## 2024-08-14 DIAGNOSIS — M16.11 UNILATERAL PRIMARY OSTEOARTHRITIS, RIGHT HIP: ICD-10-CM

## 2024-08-14 DIAGNOSIS — E83.42 HYPOMAGNESEMIA: ICD-10-CM

## 2024-08-14 DIAGNOSIS — Z79.82 LONG TERM (CURRENT) USE OF ASPIRIN: ICD-10-CM

## (undated) DEVICE — NDL 18G BLUNT FILL PINK

## (undated) DEVICE — DRSG COBAN 6"

## (undated) DEVICE — BLADE SURGICAL #15 CARBON

## (undated) DEVICE — SUT VICRYL PLUS 2-0 18" CT-2 (POP-OFF)

## (undated) DEVICE — SUT PDS II PLUS 1 27" CT-1

## (undated) DEVICE — MAKO VIZADISC HIP PROCEDURE TRACKING KIT

## (undated) DEVICE — DRSG AQUACEL 3.5 X 6"

## (undated) DEVICE — DRSG AQUACEL 3.5 X 14"

## (undated) DEVICE — PREP DURAPREP 26CC

## (undated) DEVICE — DRAPE HIP W POUCHES 87X115X134"

## (undated) DEVICE — SUT VICRYL PLUS 0 18" CT-1 UNDYED (POP-OFF)

## (undated) DEVICE — DRSG AQUACEL 3.5 X 10"

## (undated) DEVICE — MARKING PEN W RULER

## (undated) DEVICE — DRSG STOCKINETTE IMPERVIOUS XL

## (undated) DEVICE — PACK TOTAL HIP

## (undated) DEVICE — GLV 7.5 PROTEXIS ORTHO (CREAM)

## (undated) DEVICE — BLADE SURGICAL #11 CARBON

## (undated) DEVICE — SUT ETHIBOND 5 4-30" V-37

## (undated) DEVICE — DRSG TELFA 3 X 8

## (undated) DEVICE — SUT STRATAFIX SPIRAL PDO 1 30CM OS-6

## (undated) DEVICE — GLV 7.5 PROTEXIS (WHITE)

## (undated) DEVICE — ELCTR STRYKER NEPTUNE SMOKE EVACUATION PENCIL (GREEN)

## (undated) DEVICE — DRAPE LIGHT HANDLE COVER (BLUE)

## (undated) DEVICE — SUT RETRIEVER LASSO HOOK

## (undated) DEVICE — DRSG DERMABOND PRINEO 22CM

## (undated) DEVICE — SUT STRATAFIX SPIRAL MONOCRYL PLUS 4-0 30CM PS-2 UNDYED

## (undated) DEVICE — MAKO DRAPE KIT

## (undated) DEVICE — PACK ANTERIOR HIP ACSRY LNX SURGICOUNT

## (undated) DEVICE — GLV 6.5 PROTEXIS (WHITE)

## (undated) DEVICE — HOOD T7 PLUS PEELAWAY

## (undated) DEVICE — DRAPE TOWEL BLUE 17" X 24"

## (undated) DEVICE — SUT VICRYL 2-0 27" CT-1 UNDYED

## (undated) DEVICE — SAW BLADE STRYKER RECIPROCATING 77.6X0.77X11.2MM

## (undated) DEVICE — SUT STRATAFIX SYMMETRIC PDS PLUS 1 18" CT

## (undated) DEVICE — SUT STRATAFIX SPIRAL MONOCRYL PLUS 3-0 30CM PS-2 UNDYED